# Patient Record
Sex: FEMALE | Race: WHITE | NOT HISPANIC OR LATINO
[De-identification: names, ages, dates, MRNs, and addresses within clinical notes are randomized per-mention and may not be internally consistent; named-entity substitution may affect disease eponyms.]

---

## 2017-04-17 ENCOUNTER — APPOINTMENT (OUTPATIENT)
Dept: FAMILY MEDICINE | Facility: CLINIC | Age: 82
End: 2017-04-17

## 2017-04-17 ENCOUNTER — LABORATORY RESULT (OUTPATIENT)
Age: 82
End: 2017-04-17

## 2017-04-17 VITALS
BODY MASS INDEX: 31.07 KG/M2 | DIASTOLIC BLOOD PRESSURE: 80 MMHG | HEIGHT: 64 IN | SYSTOLIC BLOOD PRESSURE: 130 MMHG | WEIGHT: 182 LBS

## 2017-04-17 DIAGNOSIS — M17.0 BILATERAL PRIMARY OSTEOARTHRITIS OF KNEE: ICD-10-CM

## 2017-04-17 DIAGNOSIS — Z87.891 PERSONAL HISTORY OF NICOTINE DEPENDENCE: ICD-10-CM

## 2017-04-25 LAB
T3RU NFR SERPL: 1.12 INDEX
T4 FREE SERPL-MCNC: 0.9 NG/DL
TSH SERPL-ACNC: 6.15 UIU/ML

## 2017-05-07 ENCOUNTER — FORM ENCOUNTER (OUTPATIENT)
Age: 82
End: 2017-05-07

## 2017-05-07 DIAGNOSIS — E04.2 NONTOXIC MULTINODULAR GOITER: ICD-10-CM

## 2017-05-08 ENCOUNTER — APPOINTMENT (OUTPATIENT)
Dept: ULTRASOUND IMAGING | Facility: CLINIC | Age: 82
End: 2017-05-08

## 2017-05-08 ENCOUNTER — OUTPATIENT (OUTPATIENT)
Dept: OUTPATIENT SERVICES | Facility: HOSPITAL | Age: 82
LOS: 1 days | End: 2017-05-08
Payer: MEDICARE

## 2017-05-08 DIAGNOSIS — E03.9 HYPOTHYROIDISM, UNSPECIFIED: ICD-10-CM

## 2017-05-08 PROCEDURE — 76536 US EXAM OF HEAD AND NECK: CPT

## 2017-05-25 PROBLEM — E04.2 MULTIPLE THYROID NODULES: Status: ACTIVE | Noted: 2017-05-25

## 2017-12-23 ENCOUNTER — APPOINTMENT (OUTPATIENT)
Dept: FAMILY MEDICINE | Facility: CLINIC | Age: 82
End: 2017-12-23
Payer: MEDICARE

## 2017-12-23 PROCEDURE — 99214 OFFICE O/P EST MOD 30 MIN: CPT

## 2018-04-30 ENCOUNTER — INPATIENT (INPATIENT)
Facility: HOSPITAL | Age: 83
LOS: 0 days | Discharge: HOME | DRG: 69 | End: 2018-05-01
Attending: EMERGENCY MEDICINE | Admitting: INTERNAL MEDICINE
Payer: COMMERCIAL

## 2018-04-30 VITALS
SYSTOLIC BLOOD PRESSURE: 133 MMHG | RESPIRATION RATE: 20 BRPM | TEMPERATURE: 99 F | OXYGEN SATURATION: 100 % | HEART RATE: 60 BPM | HEIGHT: 64 IN | DIASTOLIC BLOOD PRESSURE: 68 MMHG | WEIGHT: 164.91 LBS

## 2018-04-30 DIAGNOSIS — Z96.653 PRESENCE OF ARTIFICIAL KNEE JOINT, BILATERAL: Chronic | ICD-10-CM

## 2018-04-30 DIAGNOSIS — G45.9 TRANSIENT CEREBRAL ISCHEMIC ATTACK, UNSPECIFIED: ICD-10-CM

## 2018-04-30 LAB
ALBUMIN SERPL ELPH-MCNC: 4.2 G/DL — SIGNIFICANT CHANGE UP (ref 3.3–5.2)
ALP SERPL-CCNC: 102 U/L — SIGNIFICANT CHANGE UP (ref 40–120)
ALT FLD-CCNC: 14 U/L — SIGNIFICANT CHANGE UP
ANION GAP SERPL CALC-SCNC: 11 MMOL/L — SIGNIFICANT CHANGE UP (ref 5–17)
APTT BLD: 26.8 SEC — LOW (ref 27.5–37.4)
AST SERPL-CCNC: 21 U/L — SIGNIFICANT CHANGE UP
BASOPHILS # BLD AUTO: 0 K/UL — SIGNIFICANT CHANGE UP (ref 0–0.2)
BASOPHILS NFR BLD AUTO: 0.2 % — SIGNIFICANT CHANGE UP (ref 0–2)
BILIRUB SERPL-MCNC: <0.2 MG/DL — LOW (ref 0.4–2)
BUN SERPL-MCNC: 24 MG/DL — HIGH (ref 8–20)
CALCIUM SERPL-MCNC: 9.6 MG/DL — SIGNIFICANT CHANGE UP (ref 8.6–10.2)
CHLORIDE SERPL-SCNC: 107 MMOL/L — SIGNIFICANT CHANGE UP (ref 98–107)
CO2 SERPL-SCNC: 26 MMOL/L — SIGNIFICANT CHANGE UP (ref 22–29)
CREAT SERPL-MCNC: 0.72 MG/DL — SIGNIFICANT CHANGE UP (ref 0.5–1.3)
EOSINOPHIL # BLD AUTO: 0.3 K/UL — SIGNIFICANT CHANGE UP (ref 0–0.5)
EOSINOPHIL NFR BLD AUTO: 5.3 % — SIGNIFICANT CHANGE UP (ref 0–6)
GLUCOSE SERPL-MCNC: 102 MG/DL — SIGNIFICANT CHANGE UP (ref 70–115)
HCT VFR BLD CALC: 42.5 % — SIGNIFICANT CHANGE UP (ref 37–47)
HGB BLD-MCNC: 13.5 G/DL — SIGNIFICANT CHANGE UP (ref 12–16)
INR BLD: 0.93 RATIO — SIGNIFICANT CHANGE UP (ref 0.88–1.16)
LACTATE BLDV-MCNC: 0.6 MMOL/L — SIGNIFICANT CHANGE UP (ref 0.5–2)
LYMPHOCYTES # BLD AUTO: 2.5 K/UL — SIGNIFICANT CHANGE UP (ref 1–4.8)
LYMPHOCYTES # BLD AUTO: 47.5 % — SIGNIFICANT CHANGE UP (ref 20–55)
MCHC RBC-ENTMCNC: 29.4 PG — SIGNIFICANT CHANGE UP (ref 27–31)
MCHC RBC-ENTMCNC: 31.8 G/DL — LOW (ref 32–36)
MCV RBC AUTO: 92.6 FL — SIGNIFICANT CHANGE UP (ref 81–99)
MONOCYTES # BLD AUTO: 0.5 K/UL — SIGNIFICANT CHANGE UP (ref 0–0.8)
MONOCYTES NFR BLD AUTO: 8.7 % — SIGNIFICANT CHANGE UP (ref 3–10)
NEUTROPHILS # BLD AUTO: 2 K/UL — SIGNIFICANT CHANGE UP (ref 1.8–8)
NEUTROPHILS NFR BLD AUTO: 38.1 % — SIGNIFICANT CHANGE UP (ref 37–73)
PLATELET # BLD AUTO: 192 K/UL — SIGNIFICANT CHANGE UP (ref 150–400)
POTASSIUM SERPL-MCNC: 4.5 MMOL/L — SIGNIFICANT CHANGE UP (ref 3.5–5.3)
POTASSIUM SERPL-SCNC: 4.5 MMOL/L — SIGNIFICANT CHANGE UP (ref 3.5–5.3)
PROT SERPL-MCNC: 6.8 G/DL — SIGNIFICANT CHANGE UP (ref 6.6–8.7)
PROTHROM AB SERPL-ACNC: 10.2 SEC — SIGNIFICANT CHANGE UP (ref 9.8–12.7)
RBC # BLD: 4.59 M/UL — SIGNIFICANT CHANGE UP (ref 4.4–5.2)
RBC # FLD: 14.5 % — SIGNIFICANT CHANGE UP (ref 11–15.6)
SODIUM SERPL-SCNC: 144 MMOL/L — SIGNIFICANT CHANGE UP (ref 135–145)
WBC # BLD: 5.3 K/UL — SIGNIFICANT CHANGE UP (ref 4.8–10.8)
WBC # FLD AUTO: 5.3 K/UL — SIGNIFICANT CHANGE UP (ref 4.8–10.8)

## 2018-04-30 PROCEDURE — 99220: CPT

## 2018-04-30 PROCEDURE — 71045 X-RAY EXAM CHEST 1 VIEW: CPT | Mod: 26

## 2018-04-30 PROCEDURE — 70544 MR ANGIOGRAPHY HEAD W/O DYE: CPT | Mod: 26,59

## 2018-04-30 PROCEDURE — 70551 MRI BRAIN STEM W/O DYE: CPT | Mod: 26

## 2018-04-30 PROCEDURE — 70450 CT HEAD/BRAIN W/O DYE: CPT | Mod: 26

## 2018-04-30 PROCEDURE — 93880 EXTRACRANIAL BILAT STUDY: CPT | Mod: 26

## 2018-04-30 PROCEDURE — 93010 ELECTROCARDIOGRAM REPORT: CPT

## 2018-04-30 PROCEDURE — 70547 MR ANGIOGRAPHY NECK W/O DYE: CPT | Mod: 26

## 2018-04-30 RX ORDER — SODIUM CHLORIDE 9 MG/ML
3 INJECTION INTRAMUSCULAR; INTRAVENOUS; SUBCUTANEOUS ONCE
Qty: 0 | Refills: 0 | Status: COMPLETED | OUTPATIENT
Start: 2018-04-30 | End: 2018-04-30

## 2018-04-30 RX ADMIN — SODIUM CHLORIDE 3 MILLILITER(S): 9 INJECTION INTRAMUSCULAR; INTRAVENOUS; SUBCUTANEOUS at 10:43

## 2018-04-30 NOTE — ED PROVIDER NOTE - CRITICAL CARE INDICATION, MLM
patient was critically ill... Patient was critically ill with a high probability of imminent or life threatening deterioration. SUDDEN ONSET OF STROKE SYMPTOMS STAT EVALUATION BY  MD STAT CT SCAN STAT IVS STAT LABS STAT ACCUCHECK STAT TELE STROKE CONTACT STAT NEURO CONSULT ADMIT STROKE UNIT Patient was critically ill with a high probability of imminent or life threatening deterioration. SUDDEN ONSET OF STROKE SYMPTOMS STAT EVALUATION BY  MD STAT CT SCAN STAT IVS STAT LABS STAT ACCUCHECK STAT TELE STROKE CONTACT STAT NEURO CONSULT ADMIT OBSERVATION

## 2018-04-30 NOTE — ED ADULT NURSE NOTE - OBJECTIVE STATEMENT
87 yof presents to ed complaining of sudden onset slurred speech. last seen normal 9 am. airway patent lung sounds clear equal bilaterally abd soft nontender nondistended. PERRLA pt has full ROM BL upper and lower extremities facial symmetry. speech clear.  BS 96

## 2018-04-30 NOTE — CONSULT NOTE ADULT - SUBJECTIVE AND OBJECTIVE BOX
CHIEF COMPLAINT: Difficulty speaking    HPI: 87yFemale  	  · HPI Objective Statement: 86 y/o F pt with hx of thyroid issues and eye issues BIBA to ED for confusion. leaning to right side, and slurred speech today. Code Stroke activated on arrival. Last know well 9:00 by daughter. pt's states pt did not know daughter's name. No cardiac issues. 	    Patient is back to normal.  No previous h/o stroke. TIA. No h/o HTN, DM, HLD or cardiac disease    PAST MEDICAL & SURGICAL HISTORY:  Macular degeneration  H/O total knee replacement, bilateral    MEDICATIONS  (STANDING):  sodium chloride 0.9% lock flush 3 milliLiter(s) IV Push once    MEDICATIONS  (PRN):    Allergies    No Known Allergies    Intolerances        FAMILY HISTORY:   none relevant          SOCIAL HISTORY:    Tobacco:  no  Alcohol:  no  Drugs:  no        REVIEW OF SYSTEMS:    Relevant systems are negative except as noted in the chart, HPI, and PMH      VITAL SIGNS:  Vital Signs Last 24 Hrs  T(C): 37 (30 Apr 2018 09:51), Max: 37 (30 Apr 2018 09:51)  T(F): 98.6 (30 Apr 2018 09:51), Max: 98.6 (30 Apr 2018 09:51)  HR: 60 (30 Apr 2018 10:06) (60 - 60)  BP: 144/87 (30 Apr 2018 10:06) (133/68 - 144/87)  BP(mean): --  RR: 20 (30 Apr 2018 10:06) (20 - 20)  SpO2: 100% (30 Apr 2018 10:06) (100% - 100%)    PHYSICAL EXAMINATION:    General: Well-developed, well nourished, in no acute distress.  Cardiac:  Regular rate and rhythm. No carotid bruits appreciated.  Eyes: Fundoscopic examination was deferred.  Neurologic:  - Mental Status:  Alert, awake, oriented to person, place, and time; Speech is fluent. Language is normal. Good naming, repetition, formal fluency. Follows commands well.  Insight and knowledge appear appropriate.  Cranial Nerves II-XII:    II:  Visual acuity is normal for age ; Visual fields are full to confrontation; Pupils are equal, round, and reactive to light.  III, IV, VI:  Extraocular movements are intact without nystagmus.  V:  Facial sensation is intact in the V1-V3 distribution bilaterally.  VII:  Face is symmetric with normal eye closure and smile  VIII:  Hearing is grossly intact  IX, X, XII:  speech is clear  XI:  Head turning and shoulder shrug are intact.  - Motor:  Strength is 5/5 x 4.   There is no pronator drift. .  - Reflexes:  2+ and symmetric at the knees.  Plantar responses flexor.  - Sensory:  Symmetric to light touch  - Coordination:  Finger-nose-finger is normal. Rapid alternating hand and foot  movements are intact. Dexterity appears normal      LABS:                          13.5   5.3   )-----------( 192      ( 30 Apr 2018 10:16 )             42.5                   RADIOLOGY & ADDITIONAL STUDIES:        < from: CT Head No Cont (04.30.18 @ 10:01) >  Mild chronic microvascular changes without evidence of an   acute transcortical infarction or hemorrhage.    < end of copied text >    IMPRESSION:    TIA, r/o CVA. No obvious risk factors  PLAN:  1. Stroke/TIA protocols  2. Cerebrovascular risk factor assessment and management  3. Medical and Cardiac evaluation and treatment as indicated  4.Antiplatelet therapy as prescribed.  5. MRI, MRAs,, Carotid duplex, TTE etc

## 2018-04-30 NOTE — ED PROVIDER NOTE - CONSTITUTIONAL, MLM
normal... Well appearing, well nourished, awake, alert, oriented to person, place, time/situation and in no apparent distress. Well appearing, well nourished, awake, alert, confused

## 2018-04-30 NOTE — ED PROVIDER NOTE - CARE PLAN
Principal Discharge DX:	TIA (transient ischemic attack)  Secondary Diagnosis:	Macular degeneration of both eyes, unspecified type

## 2018-04-30 NOTE — ED PROVIDER NOTE - NEUROLOGICAL, MLM
Alert and oriented, no focal deficits, no motor or sensory deficits. confused, not following some commands, leaning to right

## 2018-04-30 NOTE — ED PROVIDER NOTE - OBJECTIVE STATEMENT
88 y/o F pt with hx of thyroid issues and eye issues BIBA to ED for confusion. leaning to right side, and slurred speech today. Code Stroke activated on arrival. Last know well 9:00 by daughter. pt's states pt did not know daughter's name. No cardiac issues.   PMD: Dr. Lopez

## 2018-04-30 NOTE — ED ADULT TRIAGE NOTE - CHIEF COMPLAINT QUOTE
Patient arrived via EMS, awake ,alert, confused. breathing unlabored.  As per daughter at bedside, patient was normal this morning and at 0900am patient began to have slurred, incoherent speech, right side lean, and confusion.  Dr. Prieto called to bedside.  Code stroke called

## 2018-05-01 VITALS
SYSTOLIC BLOOD PRESSURE: 133 MMHG | HEART RATE: 70 BPM | RESPIRATION RATE: 18 BRPM | DIASTOLIC BLOOD PRESSURE: 68 MMHG | OXYGEN SATURATION: 100 %

## 2018-05-01 LAB
CHOLEST SERPL-MCNC: 222 MG/DL — HIGH (ref 110–199)
HDLC SERPL-MCNC: 59 MG/DL — LOW
LIPID PNL WITH DIRECT LDL SERPL: 151 MG/DL — SIGNIFICANT CHANGE UP
TOTAL CHOLESTEROL/HDL RATIO MEASUREMENT: 4 RATIO — SIGNIFICANT CHANGE UP (ref 3.3–7.1)
TRIGL SERPL-MCNC: 62 MG/DL — SIGNIFICANT CHANGE UP (ref 10–200)

## 2018-05-01 PROCEDURE — 80061 LIPID PANEL: CPT

## 2018-05-01 PROCEDURE — 93880 EXTRACRANIAL BILAT STUDY: CPT

## 2018-05-01 PROCEDURE — 93005 ELECTROCARDIOGRAM TRACING: CPT

## 2018-05-01 PROCEDURE — 80053 COMPREHEN METABOLIC PANEL: CPT

## 2018-05-01 PROCEDURE — 70551 MRI BRAIN STEM W/O DYE: CPT

## 2018-05-01 PROCEDURE — 71045 X-RAY EXAM CHEST 1 VIEW: CPT

## 2018-05-01 PROCEDURE — 85730 THROMBOPLASTIN TIME PARTIAL: CPT

## 2018-05-01 PROCEDURE — 83605 ASSAY OF LACTIC ACID: CPT

## 2018-05-01 PROCEDURE — 85027 COMPLETE CBC AUTOMATED: CPT

## 2018-05-01 PROCEDURE — 93306 TTE W/DOPPLER COMPLETE: CPT | Mod: 26

## 2018-05-01 PROCEDURE — 99217: CPT

## 2018-05-01 PROCEDURE — 36415 COLL VENOUS BLD VENIPUNCTURE: CPT

## 2018-05-01 PROCEDURE — 82962 GLUCOSE BLOOD TEST: CPT

## 2018-05-01 PROCEDURE — 70450 CT HEAD/BRAIN W/O DYE: CPT

## 2018-05-01 PROCEDURE — 93306 TTE W/DOPPLER COMPLETE: CPT

## 2018-05-01 PROCEDURE — 70547 MR ANGIOGRAPHY NECK W/O DYE: CPT

## 2018-05-01 PROCEDURE — 99284 EMERGENCY DEPT VISIT MOD MDM: CPT

## 2018-05-01 PROCEDURE — 70544 MR ANGIOGRAPHY HEAD W/O DYE: CPT

## 2018-05-01 PROCEDURE — 99291 CRITICAL CARE FIRST HOUR: CPT

## 2018-05-01 PROCEDURE — 85610 PROTHROMBIN TIME: CPT

## 2018-05-01 RX ORDER — ATORVASTATIN CALCIUM 80 MG/1
1 TABLET, FILM COATED ORAL
Qty: 30 | Refills: 0
Start: 2018-05-01 | End: 2018-05-30

## 2018-05-01 RX ORDER — ASPIRIN/CALCIUM CARB/MAGNESIUM 324 MG
325 TABLET ORAL ONCE
Qty: 0 | Refills: 0 | Status: COMPLETED | OUTPATIENT
Start: 2018-05-01 | End: 2018-05-01

## 2018-05-01 RX ORDER — ASPIRIN/CALCIUM CARB/MAGNESIUM 324 MG
1 TABLET ORAL
Qty: 30 | Refills: 0
Start: 2018-05-01 | End: 2018-05-30

## 2018-05-01 RX ADMIN — Medication 325 MILLIGRAM(S): at 13:03

## 2018-05-01 NOTE — PROGRESS NOTE ADULT - SUBJECTIVE AND OBJECTIVE BOX
INTERVAL HISTORY:  asymptomatic, feels well      VITAL SIGNS:  Vital Signs Last 24 Hrs  T(C): 36.8 (01 May 2018 02:40), Max: 37 (30 Apr 2018 09:51)  T(F): 98.3 (01 May 2018 02:40), Max: 98.6 (30 Apr 2018 09:51)  HR: 76 (01 May 2018 07:54) (55 - 77)  BP: 157/68 (01 May 2018 07:54) (120/58 - 158/72)  BP(mean): --  RR: 22 (01 May 2018 07:54) (16 - 22)  SpO2: 94% (01 May 2018 07:54) (94% - 100%)    PHYSICAL EXAMINATION:    Mentation:  nl  Language/Speech:nl  CN: full  Visual Fields: full  Motor: nl  Sensory: nl  DTR:    Babinski: plantar      MEDS:  MEDICATIONS  (STANDING):  aspirin 325 milliGRAM(s) Oral once    MEDICATIONS  (PRN):      LABS:                          13.5   5.3   )-----------( 192      ( 30 Apr 2018 10:16 )             42.5     04-30    144  |  107  |  24.0<H>  ----------------------------<  102  4.5   |  26.0  |  0.72    Ca    9.6      30 Apr 2018 10:16    TPro  6.8  /  Alb  4.2  /  TBili  <0.2<L>  /  DBili  x   /  AST  21  /  ALT  14  /  AlkPhos  102  04-30    LIVER FUNCTIONS - ( 30 Apr 2018 10:16 )  Alb: 4.2 g/dL / Pro: 6.8 g/dL / ALK PHOS: 102 U/L / ALT: 14 U/L / AST: 21 U/L / GGT: x               RADIOLOGY & ADDITIONAL STUDIES:    MR head, MRAs, carotid duples and TTE are all normal/satisfactory    IMPRESSION & PLAN:    TIA- undetermined etiology  Antiplatelet therapy as prescribed.  Cerebrovascular risk factor assessment and management  Medical and Cardiac evaluation and treatment as indicated-  she should be considered for JEOVANNY and ILR - ok as outpatient  Will not actively follow.   Neurologically cleared for discharge/disposition.  Please recontact as needed.  Follow up in office in 4-6 weeks as instructed. EEG, TCD? etc

## 2018-05-01 NOTE — CONSULT NOTE ADULT - SUBJECTIVE AND OBJECTIVE BOX
CARDIOLOGY CONSULTATION NOTE ([facility])    Reason for Consultation:   TIA    History obtained by: Patient and medical record    Chief complaint:   Unable to speak, AMS.     HPI  Patient is a 87y old  Female who presents with a chief complaint of unable to speak to daughter, speech was garbled,  daughter states mouth was dropping to the right, and patient was also falling over to the right.  Denies chest pain, sob, palpitations n/v/d, headache, dizziness, or any pain.   Patient had intermittent memory loss during the episode.  Denies hx of syncope stroke, or tia's.  Patient not taking aspirin or statins.  Patient also denies cardiac diease, vascular disease, dm, or any neurologic deficits  Noted elevated outpatient LDL > 160 in January.        REVIEW OF SYMPTOMS: Cardiovascular:  See HPI. No chest pain,  No dyspnea,  No syncope,  No palpitations, No dizziness, No Orthopnea,  No Paroxsymal nocturnal dyspnea;  Respiratory:  No Dyspnea, No cough,     Genitourinary:  No dysuria, no hematuria; Gastrointestinal:  No nausea, no vomiting. No diarrhea.  No abdominal pain. No dark color stool, no melena ; Neurological: See HPI, No headache, no dizziness, no slurred speech;  Psychiatric: No agitation, no anxiety.  ALL OTHER REVIEW OF SYSTEMS ARE NEGATIVE.    ALLERGIES: Allergies    CURRENT MEDICATIONS:  None     HOME MEDICATIONS:  None    PAST MEDICAL HISTORY  Macular degeneration    PAST SURGICAL HISTORY  H/O total knee replacement, bilateral      FAMILY HISTORY:  No pertinent family history in first degree relative    CIGARETTES:    Quit at age of 25, smoked 10 years.      ALCOHOL:  Denies    DRUGS:  Denies    Vital Signs Last 24 Hrs  T(C): 36.8 (01 May 2018 02:40), Max: 36.8 (01 May 2018 02:40)  T(F): 98.3 (01 May 2018 02:40), Max: 98.3 (01 May 2018 02:40)  HR: 70 (01 May 2018 11:45) (55 - 77)  BP: 133/68 (01 May 2018 11:45) (120/58 - 158/72)  BP(mean): --  RR: 18 (01 May 2018 11:45) (16 - 22)  SpO2: 100% (01 May 2018 11:45) (94% - 100%)    PHYSICAL EXAM:  Constitutional: Comfortable . No acute distress.   HEENT: Atraumatic and normcephalic , neck is supple . + left carotid bruit, no JVD. No carotid bruit. PEERL   CNS: A&Ox3. No focal deficits. EOMI. Cranial nerves II-IX are intact.   Lymph Nodes: Cervical : Not palpable.  Respiratory: CTAB, RR wnl for rate and rhythm, color pink  Cardiovascular: S1S2 RRR. No murmur/rubs or gallop.  Gastrointestinal: Soft non-tender and non distended . +Bowel sounds. negative Betancur's sign.  Extremities: No edema.   Psychiatric: Calm . no agitation.  Skin: No skin rash/ulcers visualized to face, hands or feet.    LABS:                        13.5   5.3   )-----------( 192      ( 30 Apr 2018 10:16 )             42.5       144  |  107  |  24.0<H>  ----------------------------<  102  4.5   |  26.0  |  0.72  Ca    9.6      30 Apr 2018 10:16  TPro  6.8  /  Alb  4.2  /  TBili  <0.2<L>  /  DBili  x   /  AST  21  /  ALT  14  /  AlkPhos  102  04-30  PT/INR - ( 30 Apr 2018 10:16 )   PT: 10.2 sec;   INR: 0.93 ratio    PTT - ( 30 Apr 2018 10:16 )  PTT:26.8 sec    INTERPRETATION OF TELEMETRY: Reviewed by me.  SR, no ecotpry noted.    ECG: Reviewed by me.     RADIOLOGY & ADDITIONAL STUDIES:    X-ray:  reviewed by me.    Impression: No acute pulmonary disease.    CT scan:    IMPRESSION:  Mild chronic microvascular changes without evidence of an   acute transcortical infarction or hemorrhage. MR is a more sensitive   imaging modality for the evaluation of an acute infarction. Findings   discussed with Dr. Prieto at 9:58 AM.  MRI:   IMPRESSION: No acute infarction  MRA Head  IMPRESSION: Unremarkable MRA of the brain.  MRA Neck  IMPRESSION:  Unremarkable noncontrast MRA of the neck.  ECHO FINDINGS:    Summary:   1. Left ventricular ejection fraction, by visual estimation, is 70 to   75%.   2. Normal global left ventricular systolic function.   3. Spectral Doppler shows impaired relaxation pattern of left   ventricular myocardial filling (Grade I diastolic dysfunction).   4. Normal right ventricular size and function.   5. There is no evidence of pericardial effusion.   6. Trace mitral valve regurgitation.   7. The main pulmonary artery is normal in size. CARDIOLOGY CONSULTATION NOTE ([facility])    Reason for Consultation:   TIA    History obtained by: Patient and medical record    Chief complaint:   Unable to speak, AMS.     HPI  Patient is a 87y old  Female who presents with a chief complaint of unable to speak to daughter, speech was garbled,  daughter states mouth was dropping to the right, and patient was also falling over to the right.  Denies chest pain, sob, palpitations n/v/d, headache, dizziness, or any pain.   Patient had intermittent memory loss during the episode.  Denies hx of syncope stroke, or tia's.  Patient not taking aspirin or statins.  Patient also denies cardiac diease, vascular disease, dm, or any neurologic deficits  Noted elevated outpatient LDL > 160 in 2016.      REVIEW OF SYMPTOMS: Cardiovascular:  See HPI. No chest pain,  No dyspnea,  No syncope,  No palpitations, No dizziness, No Orthopnea,  No Paroxsymal nocturnal dyspnea;  Respiratory:  No Dyspnea, No cough,     Genitourinary:  No dysuria, no hematuria; Gastrointestinal:  No nausea, no vomiting. No diarrhea.  No abdominal pain. No dark color stool, no melena ; Neurological: See HPI, No headache, no dizziness, no slurred speech;  Psychiatric: No agitation, no anxiety.  ALL OTHER REVIEW OF SYSTEMS ARE NEGATIVE.    ALLERGIES: Allergies    CURRENT MEDICATIONS:  None     HOME MEDICATIONS:  None    PAST MEDICAL HISTORY  Macular degeneration    PAST SURGICAL HISTORY  H/O total knee replacement, bilateral      FAMILY HISTORY:  No pertinent family history in first degree relative    CIGARETTES:    Quit at age of 25, smoked 10 years.      ALCOHOL:  Denies    DRUGS:  Denies    Vital Signs Last 24 Hrs  T(C): 36.8 (01 May 2018 02:40), Max: 36.8 (01 May 2018 02:40)  T(F): 98.3 (01 May 2018 02:40), Max: 98.3 (01 May 2018 02:40)  HR: 70 (01 May 2018 11:45) (55 - 77)  BP: 133/68 (01 May 2018 11:45) (120/58 - 158/72)  BP(mean): --  RR: 18 (01 May 2018 11:45) (16 - 22)  SpO2: 100% (01 May 2018 11:45) (94% - 100%)    PHYSICAL EXAM:  Constitutional: Comfortable . No acute distress.   HEENT: Atraumatic and normcephalic , neck is supple . + left carotid bruit, no JVD. No carotid bruit. PEERL   CNS: A&Ox3. No focal deficits. EOMI. Cranial nerves II-IX are intact.   Lymph Nodes: Cervical : Not palpable.  Respiratory: CTAB, RR wnl for rate and rhythm, color pink  Cardiovascular: S1S2 RRR. No murmur/rubs or gallop.  Gastrointestinal: Soft non-tender and non distended . +Bowel sounds. negative Betancur's sign.  Extremities: No edema.   Psychiatric: Calm . no agitation.  Skin: No skin rash/ulcers visualized to face, hands or feet.    LABS:                        13.5   5.3   )-----------( 192      ( 30 Apr 2018 10:16 )             42.5       144  |  107  |  24.0<H>  ----------------------------<  102  4.5   |  26.0  |  0.72  Ca    9.6      30 Apr 2018 10:16  TPro  6.8  /  Alb  4.2  /  TBili  <0.2<L>  /  DBili  x   /  AST  21  /  ALT  14  /  AlkPhos  102  04-30  PT/INR - ( 30 Apr 2018 10:16 )   PT: 10.2 sec;   INR: 0.93 ratio    PTT - ( 30 Apr 2018 10:16 )  PTT:26.8 sec    INTERPRETATION OF TELEMETRY: Reviewed by me.  SR, no ecotpry noted.    ECG: Reviewed by me.     RADIOLOGY & ADDITIONAL STUDIES:    X-ray:  reviewed by me.    Impression: No acute pulmonary disease.    CT scan:    IMPRESSION:  Mild chronic microvascular changes without evidence of an   acute transcortical infarction or hemorrhage. MR is a more sensitive   imaging modality for the evaluation of an acute infarction. Findings   discussed with Dr. Prieot at 9:58 AM.  MRI:   IMPRESSION: No acute infarction  MRA Head  IMPRESSION: Unremarkable MRA of the brain.  MRA Neck  IMPRESSION:  Unremarkable noncontrast MRA of the neck.  ECHO FINDINGS:    Summary:   1. Left ventricular ejection fraction, by visual estimation, is 70 to   75%.   2. Normal global left ventricular systolic function.   3. Spectral Doppler shows impaired relaxation pattern of left   ventricular myocardial filling (Grade I diastolic dysfunction).   4. Normal right ventricular size and function.   5. There is no evidence of pericardial effusion.   6. Trace mitral valve regurgitation.   7. The main pulmonary artery is normal in size.

## 2018-05-01 NOTE — ED CDU PROVIDER DISPOSITION NOTE - CLINICAL COURSE
patient was admitted to OBS for TIA workup. Patient has remained without any neuro deficits while in hospital. Patient MRI brain, echo and carotids were negative. Patient was evaluated by neuro and cardio.   patient is stable for discharge for outpatient workup  patient and family agree with plan

## 2018-05-01 NOTE — CONSULT NOTE ADULT - ASSESSMENT
Patient is a 87y old  Female who presents with a chief complaint of unable to speak to daughter, speech was garbled,  daughter states mouth was dropping to the right, and patient was also falling over to the right.  Denies chest pain, sob, palpitations n/v/d, headache, dizziness, or any pain.   Patient had intermittent memory loss during the episode.  Denies hx of syncope stroke, or tia's.  Patient not taking aspirin or statins.  Patient also denies cardiac diease, vascular disease, dm, or any neurologic deficits  Noted elevated outpatient LDL > 160 in January.      Problem/Plan  ·	TIA, MRI/MRA/ and carotid studies stable and cleared for discharge.    Stable to discharge home  Add aspirirn 81mg po dialy  Lipitor 10mg po qhs  Follow up with PCP MD Lopez.

## 2018-05-01 NOTE — ED ADULT NURSE REASSESSMENT NOTE - NS ED NURSE REASSESS COMMENT FT1
pt sitting in chair eating lunch  daughters at bedside
pt up ambulating to bathroom with daughters gait steady  no change in condition
Pt. received at 1930, VSS, awake. alert, and answering all questions.  no deficits noted. informed of plan of care. will continue to monitor and maintain safety.
pt ret'd from ECHO  Dr Kwon at bedside for neuro eval     daughter at bedside
pt ret'd from testing    daughter at bedside  pt timo breakfast  no complaints  no change
transported to [place]
Pt. sleeping but awakens to voice, NIH remains 0. awaiting test in AM. will continue to monitor and maintain safety.

## 2018-05-01 NOTE — CONSULT NOTE ADULT - ATTENDING COMMENTS
I saw and examined Mrs. Torres. We reviewed her studies. TTE reviewed as well. Episode of TIA, lasted about 20 minutes. Two of her daughter at bedside. Pt was not on antiplatelet therapy at home. I agree with use of ASA and statin.   She will need follow up in the office. Family will make appt and fu

## 2018-05-02 PROBLEM — H35.30 UNSPECIFIED MACULAR DEGENERATION: Chronic | Status: ACTIVE | Noted: 2018-04-30

## 2018-05-02 RX ORDER — CEFPROZIL 500 MG/1
500 TABLET ORAL
Qty: 20 | Refills: 0 | Status: DISCONTINUED | COMMUNITY
Start: 2017-12-23 | End: 2018-05-02

## 2018-05-10 ENCOUNTER — APPOINTMENT (OUTPATIENT)
Dept: FAMILY MEDICINE | Facility: CLINIC | Age: 83
End: 2018-05-10
Payer: MEDICARE

## 2018-05-10 VITALS — SYSTOLIC BLOOD PRESSURE: 144 MMHG | DIASTOLIC BLOOD PRESSURE: 84 MMHG

## 2018-05-10 VITALS
BODY MASS INDEX: 31.07 KG/M2 | SYSTOLIC BLOOD PRESSURE: 164 MMHG | HEIGHT: 64 IN | OXYGEN SATURATION: 97 % | DIASTOLIC BLOOD PRESSURE: 70 MMHG | HEART RATE: 52 BPM | WEIGHT: 182 LBS | RESPIRATION RATE: 12 BRPM

## 2018-05-10 DIAGNOSIS — Z12.83 ENCOUNTER FOR SCREENING FOR MALIGNANT NEOPLASM OF SKIN: ICD-10-CM

## 2018-05-10 DIAGNOSIS — R60.0 LOCALIZED EDEMA: ICD-10-CM

## 2018-05-10 PROCEDURE — 99495 TRANSJ CARE MGMT MOD F2F 14D: CPT

## 2018-05-13 DIAGNOSIS — Z96.1 PRESENCE OF INTRAOCULAR LENS: ICD-10-CM

## 2018-05-13 DIAGNOSIS — H35.3122 NONEXUDATIVE AGE-RELATED MACULAR DEGENERATION, LEFT EYE, INTERMEDIATE DRY STAGE: ICD-10-CM

## 2018-05-21 ENCOUNTER — APPOINTMENT (OUTPATIENT)
Dept: CARDIOLOGY | Facility: CLINIC | Age: 83
End: 2018-05-21
Payer: MEDICARE

## 2018-05-21 ENCOUNTER — NON-APPOINTMENT (OUTPATIENT)
Age: 83
End: 2018-05-21

## 2018-05-21 VITALS — SYSTOLIC BLOOD PRESSURE: 122 MMHG | DIASTOLIC BLOOD PRESSURE: 64 MMHG

## 2018-05-21 VITALS
DIASTOLIC BLOOD PRESSURE: 68 MMHG | BODY MASS INDEX: 31.24 KG/M2 | SYSTOLIC BLOOD PRESSURE: 124 MMHG | WEIGHT: 182 LBS | HEART RATE: 56 BPM | OXYGEN SATURATION: 96 %

## 2018-05-21 PROCEDURE — 93000 ELECTROCARDIOGRAM COMPLETE: CPT

## 2018-05-21 PROCEDURE — 99214 OFFICE O/P EST MOD 30 MIN: CPT

## 2018-06-13 ENCOUNTER — APPOINTMENT (OUTPATIENT)
Dept: FAMILY MEDICINE | Facility: CLINIC | Age: 83
End: 2018-06-13
Payer: MEDICARE

## 2018-06-13 VITALS
SYSTOLIC BLOOD PRESSURE: 116 MMHG | BODY MASS INDEX: 30.73 KG/M2 | HEART RATE: 67 BPM | HEIGHT: 64 IN | WEIGHT: 180 LBS | DIASTOLIC BLOOD PRESSURE: 72 MMHG | OXYGEN SATURATION: 97 %

## 2018-06-13 PROCEDURE — 99214 OFFICE O/P EST MOD 30 MIN: CPT

## 2018-06-13 RX ORDER — LISINOPRIL 10 MG/1
10 TABLET ORAL DAILY
Qty: 90 | Refills: 1 | Status: DISCONTINUED | COMMUNITY
Start: 2018-05-10 | End: 2018-06-13

## 2018-06-14 NOTE — PHYSICAL EXAM
[No Acute Distress] : no acute distress [Normal Oropharynx] : the oropharynx was normal [Supple] : supple [Clear to Auscultation] : lungs were clear to auscultation bilaterally [Normal S1, S2] : normal S1 and S2 [No Focal Deficits] : no focal deficits [Normal Affect] : the affect was normal [Soft] : abdomen soft

## 2018-06-14 NOTE — HISTORY OF PRESENT ILLNESS
[FreeTextEntry1] : Patient at office for follow up, states last Lisinopril taken approximately 10 days after Rx given on May 10, 2018 as she developed diarrhea. Patient requesting refill on Atorvastatin, states last use about 1 week ago. [de-identified] : pt notes feel good, pt has 288 pages from [facility], scaanned in prior to chart update, will have team review and abstract pertinent health maintenance as pt has completed a lot of maintence elemnets in past\par \par long discussion w pt and son in law about tia episode which occurred without pt on asa, pt now on asa 81mg, \par pt reports explosive diarrhea and ioncontinence form lisinopril pt self dc and resolved

## 2018-06-14 NOTE — REVIEW OF SYSTEMS
[Fever] : no fever [Earache] : no earache [Chest Pain] : no chest pain [Shortness Of Breath] : no shortness of breath [Abdominal Pain] : no abdominal pain [Headache] : no headache [FreeTextEntry7] : see hpi

## 2018-07-24 PROBLEM — H35.3122 INTERMEDIATE STAGE DRY AGE-RELATED MACULAR DEGENERATION OF LEFT EYE: Status: ACTIVE | Noted: 2018-07-06

## 2018-07-24 PROBLEM — Z96.1 PSEUDOPHAKIA: Status: ACTIVE | Noted: 2018-07-06

## 2018-09-13 ENCOUNTER — APPOINTMENT (OUTPATIENT)
Dept: FAMILY MEDICINE | Facility: CLINIC | Age: 83
End: 2018-09-13
Payer: MEDICARE

## 2018-09-13 VITALS
HEIGHT: 64 IN | WEIGHT: 187 LBS | OXYGEN SATURATION: 97 % | HEART RATE: 57 BPM | SYSTOLIC BLOOD PRESSURE: 120 MMHG | BODY MASS INDEX: 31.92 KG/M2 | DIASTOLIC BLOOD PRESSURE: 70 MMHG

## 2018-09-13 DIAGNOSIS — E03.9 HYPOTHYROIDISM, UNSPECIFIED: ICD-10-CM

## 2018-09-13 DIAGNOSIS — Z87.09 PERSONAL HISTORY OF OTHER DISEASES OF THE RESPIRATORY SYSTEM: ICD-10-CM

## 2018-09-13 DIAGNOSIS — Z92.29 PERSONAL HISTORY OF OTHER DRUG THERAPY: ICD-10-CM

## 2018-09-13 DIAGNOSIS — H54.2X11: ICD-10-CM

## 2018-09-13 PROCEDURE — 90662 IIV NO PRSV INCREASED AG IM: CPT

## 2018-09-13 PROCEDURE — G0008: CPT

## 2018-09-13 PROCEDURE — 90670 PCV13 VACCINE IM: CPT

## 2018-09-13 PROCEDURE — G0009: CPT

## 2018-09-13 PROCEDURE — 99214 OFFICE O/P EST MOD 30 MIN: CPT | Mod: 25

## 2018-09-13 RX ORDER — ALBUTEROL SULFATE 90 UG/1
108 (90 BASE) AEROSOL, METERED RESPIRATORY (INHALATION)
Qty: 1 | Refills: 0 | Status: DISCONTINUED | COMMUNITY
Start: 2017-12-23 | End: 2018-09-13

## 2018-09-15 NOTE — PHYSICAL EXAM
[No Acute Distress] : no acute distress [Normal Sclera/Conjunctiva] : normal sclera/conjunctiva [Normal Oropharynx] : the oropharynx was normal [Supple] : supple [No Lymphadenopathy] : no lymphadenopathy [No Respiratory Distress] : no respiratory distress  [Clear to Auscultation] : lungs were clear to auscultation bilaterally [Normal Rate] : normal rate  [Regular Rhythm] : with a regular rhythm [Normal S1, S2] : normal S1 and S2 [No Murmur] : no murmur heard [No Edema] : there was no peripheral edema [Normal Anterior Cervical Nodes] : no anterior cervical lymphadenopathy [Normal Mood] : the mood was normal

## 2018-09-15 NOTE — HISTORY OF PRESENT ILLNESS
[FreeTextEntry1] : Patient here to follow up on blood work she had done at [facility] 09/06/18.\par Also requesting a refill on her Atorvastatin. [de-identified] : Pt feels well.

## 2018-11-26 ENCOUNTER — APPOINTMENT (OUTPATIENT)
Dept: CARDIOLOGY | Facility: CLINIC | Age: 83
End: 2018-11-26

## 2019-01-21 DIAGNOSIS — H18.599 OTHER HEREDITARY CORNEAL DYSTROPHIES, UNSPECIFIED EYE: ICD-10-CM

## 2019-01-21 DIAGNOSIS — H40.9 UNSPECIFIED GLAUCOMA: ICD-10-CM

## 2019-02-13 ENCOUNTER — LABORATORY RESULT (OUTPATIENT)
Age: 84
End: 2019-02-13

## 2019-02-25 LAB
ALBUMIN SERPL ELPH-MCNC: 4.3 G/DL
ALP BLD-CCNC: 117 U/L
ALT SERPL-CCNC: 18 U/L
ANION GAP SERPL CALC-SCNC: 11 MMOL/L
AST SERPL-CCNC: 20 U/L
BILIRUB SERPL-MCNC: 0.5 MG/DL
BUN SERPL-MCNC: 22 MG/DL
CALCIUM SERPL-MCNC: 9.8 MG/DL
CHLORIDE SERPL-SCNC: 109 MMOL/L
CHOLEST SERPL-MCNC: 184 MG/DL
CHOLEST/HDLC SERPL: 2.6 RATIO
CO2 SERPL-SCNC: 26 MMOL/L
CREAT SERPL-MCNC: 0.71 MG/DL
GLUCOSE SERPL-MCNC: 87 MG/DL
HDLC SERPL-MCNC: 72 MG/DL
LDLC SERPL CALC-MCNC: 98 MG/DL
POTASSIUM SERPL-SCNC: 4.4 MMOL/L
PROT SERPL-MCNC: 6.5 G/DL
SODIUM SERPL-SCNC: 146 MMOL/L
T3RU NFR SERPL: 1.17 INDEX
T4 FREE SERPL-MCNC: 0.9 NG/DL
THYROGLOB AB SERPL-ACNC: <20 IU/ML
THYROPEROXIDASE AB SERPL IA-ACNC: <10 IU/ML
TRIGL SERPL-MCNC: 72 MG/DL
TSH SERPL-ACNC: 6.81 UIU/ML

## 2019-03-06 ENCOUNTER — APPOINTMENT (OUTPATIENT)
Dept: FAMILY MEDICINE | Facility: CLINIC | Age: 84
End: 2019-03-06
Payer: MEDICARE

## 2019-03-06 VITALS — BODY MASS INDEX: 31.41 KG/M2 | WEIGHT: 183 LBS

## 2019-03-06 VITALS
WEIGHT: 157 LBS | DIASTOLIC BLOOD PRESSURE: 64 MMHG | HEART RATE: 70 BPM | BODY MASS INDEX: 26.8 KG/M2 | SYSTOLIC BLOOD PRESSURE: 120 MMHG | HEIGHT: 64 IN | RESPIRATION RATE: 12 BRPM | OXYGEN SATURATION: 98 %

## 2019-03-06 DIAGNOSIS — Z92.29 PERSONAL HISTORY OF OTHER DRUG THERAPY: ICD-10-CM

## 2019-03-06 DIAGNOSIS — H35.30 UNSPECIFIED MACULAR DEGENERATION: ICD-10-CM

## 2019-03-06 DIAGNOSIS — G45.9 TRANSIENT CEREBRAL ISCHEMIC ATTACK, UNSPECIFIED: ICD-10-CM

## 2019-03-06 DIAGNOSIS — Z87.898 PERSONAL HISTORY OF OTHER SPECIFIED CONDITIONS: ICD-10-CM

## 2019-03-06 PROCEDURE — 99215 OFFICE O/P EST HI 40 MIN: CPT

## 2019-03-07 ENCOUNTER — APPOINTMENT (OUTPATIENT)
Dept: FAMILY MEDICINE | Facility: CLINIC | Age: 84
End: 2019-03-07

## 2019-03-07 PROBLEM — H35.30 AMD (AGE RELATED MACULAR DEGENERATION): Status: ACTIVE | Noted: 2017-12-26

## 2019-03-07 NOTE — REVIEW OF SYSTEMS
[Hearing Loss] : hearing loss [Fever] : no fever [Earache] : no earache [Nasal Discharge] : no nasal discharge [Chest Pain] : no chest pain [Shortness Of Breath] : no shortness of breath [Vomiting] : no vomiting [FreeTextEntry3] : macular degen

## 2019-03-07 NOTE — PHYSICAL EXAM
[Normal Outer Ear/Nose] : the outer ears and nose were normal in appearance [Normal Oropharynx] : the oropharynx was normal [Supple] : supple [Clear to Auscultation] : lungs were clear to auscultation bilaterally [Normal S1, S2] : normal S1 and S2 [Soft] : abdomen soft [Normal Anterior Cervical Nodes] : no anterior cervical lymphadenopathy [No CVA Tenderness] : no CVA  tenderness [de-identified] : no cogwheel rigiidity  [de-identified] : facies symmetric, equsl strength bl

## 2019-03-07 NOTE — HISTORY OF PRESENT ILLNESS
[FreeTextEntry1] : Pt is here for follow up and blood work results. [de-identified] : pt notes no cp, no sob, pt daughter notes mom has auditory halluciantions , as she cant see well w macular degeneratipon, and cant hear well either. pt notes she does lose urine at times but does not keep her home , she did not want to join a senior group, pt daughter also notesshe has been  [place] in   the winter, i encouraged pt to get to some actiivites and wear depnds iff that i a concern.

## 2019-03-08 ENCOUNTER — EMERGENCY (EMERGENCY)
Facility: HOSPITAL | Age: 84
LOS: 1 days | Discharge: HOME | End: 2019-03-08
Attending: EMERGENCY MEDICINE
Payer: MEDICARE

## 2019-03-08 VITALS
SYSTOLIC BLOOD PRESSURE: 99 MMHG | HEART RATE: 61 BPM | DIASTOLIC BLOOD PRESSURE: 63 MMHG | OXYGEN SATURATION: 95 % | RESPIRATION RATE: 18 BRPM | TEMPERATURE: 99 F

## 2019-03-08 VITALS
DIASTOLIC BLOOD PRESSURE: 73 MMHG | TEMPERATURE: 100 F | RESPIRATION RATE: 18 BRPM | SYSTOLIC BLOOD PRESSURE: 118 MMHG | HEART RATE: 71 BPM | OXYGEN SATURATION: 95 %

## 2019-03-08 DIAGNOSIS — Z96.653 PRESENCE OF ARTIFICIAL KNEE JOINT, BILATERAL: Chronic | ICD-10-CM

## 2019-03-08 LAB
ALBUMIN SERPL ELPH-MCNC: 3.4 G/DL — SIGNIFICANT CHANGE UP (ref 3.3–5.2)
ALP SERPL-CCNC: 115 U/L — SIGNIFICANT CHANGE UP (ref 40–120)
ALT FLD-CCNC: 30 U/L — SIGNIFICANT CHANGE UP
ANION GAP SERPL CALC-SCNC: 11 MMOL/L — SIGNIFICANT CHANGE UP (ref 5–17)
APPEARANCE UR: CLEAR — SIGNIFICANT CHANGE UP
AST SERPL-CCNC: 34 U/L — HIGH
BACTERIA # UR AUTO: ABNORMAL
BASOPHILS # BLD AUTO: 0 K/UL — SIGNIFICANT CHANGE UP (ref 0–0.2)
BASOPHILS NFR BLD AUTO: 0.1 % — SIGNIFICANT CHANGE UP (ref 0–2)
BILIRUB SERPL-MCNC: 0.5 MG/DL — SIGNIFICANT CHANGE UP (ref 0.4–2)
BILIRUB UR-MCNC: NEGATIVE — SIGNIFICANT CHANGE UP
BUN SERPL-MCNC: 20 MG/DL — SIGNIFICANT CHANGE UP (ref 8–20)
CALCIUM SERPL-MCNC: 9.2 MG/DL — SIGNIFICANT CHANGE UP (ref 8.6–10.2)
CHLORIDE SERPL-SCNC: 106 MMOL/L — SIGNIFICANT CHANGE UP (ref 98–107)
CO2 SERPL-SCNC: 20 MMOL/L — LOW (ref 22–29)
COLOR SPEC: YELLOW — SIGNIFICANT CHANGE UP
CREAT SERPL-MCNC: 0.56 MG/DL — SIGNIFICANT CHANGE UP (ref 0.5–1.3)
DIFF PNL FLD: ABNORMAL
EOSINOPHIL # BLD AUTO: 0 K/UL — SIGNIFICANT CHANGE UP (ref 0–0.5)
EOSINOPHIL NFR BLD AUTO: 0.1 % — SIGNIFICANT CHANGE UP (ref 0–6)
EPI CELLS # UR: ABNORMAL
GLUCOSE SERPL-MCNC: 116 MG/DL — HIGH (ref 70–115)
GLUCOSE UR QL: NEGATIVE MG/DL — SIGNIFICANT CHANGE UP
HCT VFR BLD CALC: 38.8 % — SIGNIFICANT CHANGE UP (ref 37–47)
HGB BLD-MCNC: 12.6 G/DL — SIGNIFICANT CHANGE UP (ref 12–16)
KETONES UR-MCNC: ABNORMAL
LEUKOCYTE ESTERASE UR-ACNC: ABNORMAL
LYMPHOCYTES # BLD AUTO: 1.8 K/UL — SIGNIFICANT CHANGE UP (ref 1–4.8)
LYMPHOCYTES # BLD AUTO: 17.3 % — LOW (ref 20–55)
MCHC RBC-ENTMCNC: 29.9 PG — SIGNIFICANT CHANGE UP (ref 27–31)
MCHC RBC-ENTMCNC: 32.5 G/DL — SIGNIFICANT CHANGE UP (ref 32–36)
MCV RBC AUTO: 92.2 FL — SIGNIFICANT CHANGE UP (ref 81–99)
MONOCYTES # BLD AUTO: 1.4 K/UL — HIGH (ref 0–0.8)
MONOCYTES NFR BLD AUTO: 12.9 % — HIGH (ref 3–10)
NEUTROPHILS # BLD AUTO: 7.3 K/UL — SIGNIFICANT CHANGE UP (ref 1.8–8)
NEUTROPHILS NFR BLD AUTO: 69.4 % — SIGNIFICANT CHANGE UP (ref 37–73)
NITRITE UR-MCNC: NEGATIVE — SIGNIFICANT CHANGE UP
PH UR: 5 — SIGNIFICANT CHANGE UP (ref 5–8)
PLATELET # BLD AUTO: 171 K/UL — SIGNIFICANT CHANGE UP (ref 150–400)
POTASSIUM SERPL-MCNC: 3.9 MMOL/L — SIGNIFICANT CHANGE UP (ref 3.5–5.3)
POTASSIUM SERPL-SCNC: 3.9 MMOL/L — SIGNIFICANT CHANGE UP (ref 3.5–5.3)
PROT SERPL-MCNC: 6.5 G/DL — LOW (ref 6.6–8.7)
PROT UR-MCNC: 15 MG/DL
RBC # BLD: 4.21 M/UL — LOW (ref 4.4–5.2)
RBC # FLD: 14.4 % — SIGNIFICANT CHANGE UP (ref 11–15.6)
RBC CASTS # UR COMP ASSIST: SIGNIFICANT CHANGE UP /HPF (ref 0–4)
SODIUM SERPL-SCNC: 137 MMOL/L — SIGNIFICANT CHANGE UP (ref 135–145)
SP GR SPEC: 1.01 — SIGNIFICANT CHANGE UP (ref 1.01–1.02)
TROPONIN T SERPL-MCNC: <0.01 NG/ML — SIGNIFICANT CHANGE UP (ref 0–0.06)
UROBILINOGEN FLD QL: 1 MG/DL
WBC # BLD: 10.6 K/UL — SIGNIFICANT CHANGE UP (ref 4.8–10.8)
WBC # FLD AUTO: 10.6 K/UL — SIGNIFICANT CHANGE UP (ref 4.8–10.8)
WBC UR QL: ABNORMAL

## 2019-03-08 PROCEDURE — 93005 ELECTROCARDIOGRAM TRACING: CPT

## 2019-03-08 PROCEDURE — 99284 EMERGENCY DEPT VISIT MOD MDM: CPT

## 2019-03-08 PROCEDURE — 84484 ASSAY OF TROPONIN QUANT: CPT

## 2019-03-08 PROCEDURE — 93010 ELECTROCARDIOGRAM REPORT: CPT

## 2019-03-08 PROCEDURE — 99283 EMERGENCY DEPT VISIT LOW MDM: CPT

## 2019-03-08 PROCEDURE — 36415 COLL VENOUS BLD VENIPUNCTURE: CPT

## 2019-03-08 PROCEDURE — 81001 URINALYSIS AUTO W/SCOPE: CPT

## 2019-03-08 PROCEDURE — 87086 URINE CULTURE/COLONY COUNT: CPT

## 2019-03-08 PROCEDURE — 80053 COMPREHEN METABOLIC PANEL: CPT

## 2019-03-08 PROCEDURE — 85027 COMPLETE CBC AUTOMATED: CPT

## 2019-03-08 RX ORDER — CEPHALEXIN 500 MG
1 CAPSULE ORAL
Qty: 14 | Refills: 0
Start: 2019-03-08

## 2019-03-08 NOTE — ED PROVIDER NOTE - PHYSICAL EXAMINATION
Gen: No acute distress, non toxic  HEENT: Mucous membranes moist, pink conjunctivae, EOMI  CV: RRR, nl s1/s2.  Resp: CTAB, normal rate and effort  GI: Abdomen soft, NT, ND. No rebound, no guarding  : No CVAT  Neuro: A&O x 3, moving all 4 extremities 5/5 strength all extremities. ambulates but with slowly and weaker/needing assistance.  MSK: No spine or joint tenderness to palpation  Skin: No rashes. intact and perfused.

## 2019-03-08 NOTE — ED ADULT NURSE NOTE - OBJECTIVE STATEMENT
Pt BIBA A&ox3 c/o generalized B/L lower extremity weakness. Reports she was using the bathroom and was unable to get up from the toilet. Family states they had to assist her ambulating to her chair. Pt denies any pain or discomfort. Denies any unilateral weakness. Denies LOC or straining to have a BM. No apparent distress noted.  As per daughter pt recently has been hearing voices, denies chest pain or SOB , offers no complaints

## 2019-03-08 NOTE — PROVIDER CONTACT NOTE (OTHER) - SITUATION
CM INFORMED PT IS REQUIRING CHHA AND DME.
PT orders received. Chart reviewed, contents noted. Pt seen in ED for PT evaluation, see consult for details. Pt denies pain pre/post session. Pt left as found, NAD. Daughter present. PT to follow up

## 2019-03-08 NOTE — ED PROVIDER NOTE - CLINICAL SUMMARY MEDICAL DECISION MAKING FREE TEXT BOX
generalized weakness, has improved but still slight difficulty with gait when PT saw pt. small uti, will treat empirically, d/christophe with walker, will get home PT, return precautions. well appearing and feeling nl besides minor residual difficulty walking. daughter living with pt.

## 2019-03-08 NOTE — PROVIDER CONTACT NOTE (OTHER) - ASSESSMENT
CM MET W/ PT AND FAMILY AT BS.  PER PT REC'S, PT NEEDS CH.  CHOICE LIST GIVEN, FAMILY CHOOSING [facility].  REFERRAL MADE VIA ACM.  SOC FOR IN THE AM. PT REQUIRING RW AS WELL.  TAKEN FORM FLOOR STOCK AND DELIVERED TO BEDSIDE.  PT STABLE FOR DC FROM CM STANDPOINT.
PT RECOMMENDATION:  home with home PT/assist; Rolling walker for home.  CCC: Smiley valdes.

## 2019-03-08 NOTE — PHYSICAL THERAPY INITIAL EVALUATION ADULT - LEVEL OF INDEPENDENCE: GAIT, REHAB EVAL
(pt also marched 10x at side of stretcher). pt also ambulated 5 feet without device-unsteady, reaching for chair/contact guard

## 2019-03-08 NOTE — PHYSICAL THERAPY INITIAL EVALUATION ADULT - ADDITIONAL COMMENTS
Pt/daughter report that pt lives with daughter and son-in-law in a private house. 3 steps to enter with rail. None indoors. Pt ambulates household distances, independently. Owns a cane and rollator, but does not use either one. Daughter reports that she has encouraged pt to use cane and pt refuses.

## 2019-03-08 NOTE — ED ADULT NURSE NOTE - INTERVENTIONS DEFINITIONS
[place] to call system/Instruct patient to call for assistance/Call bell, personal items and telephone within reach/Provide visual cue, wrist band, yellow gown, etc./Stretcher in lowest position, wheels locked, appropriate side rails in place/Room bathroom lighting operational/Non-slip footwear when patient is off stretcher/Physically safe environment: no spills, clutter or unnecessary equipment

## 2019-03-08 NOTE — DISCHARGE NOTE ADULT - PATIENT PORTAL LINK FT
You can access the [facility] Patient Portal, offered by [facility], by registering with the following website: [email or web address]

## 2019-03-08 NOTE — PROVIDER CONTACT NOTE (OTHER) - RECOMMENDATIONS
PT GOALS:  Within 1 week:  Bed mobility Independently, transfers and gait modified independently with least restrictive device x 50 feet. Negotiate 3 steps with single rail and supervision.

## 2019-03-08 NOTE — PHYSICAL THERAPY INITIAL EVALUATION ADULT - PERTINENT HX OF CURRENT PROBLEM, REHAB EVAL
86 y/o female presented to ED with c/o generalized weakness bilateral LE's. Required assist to get up from toilet. UA pending

## 2019-03-08 NOTE — ED PROVIDER NOTE - OBJECTIVE STATEMENT
88 y/o female hx macular degeneration c/o generalized weakness today. Went to bathroom, was unable to get up from it. Unable to get up from couch as well. No focal symptoms, no cp, sob, headache, f/c, urinary symptoms. Usual state of health  yesterday, ambualted normally without help. Daughter bedside lives with pt.    ROS: No fever/chills. No eye pain/changes in vision, No ear pain/sore throat/dysphagia, No chest pain/palpitations. No SOB/cough/. No abdominal pain, N/V/D, no black/bloody bm. No dysuria/frequency/discharge, No headache. No Dizziness.    No rashes or breaks in skin. No numbness/tingling/weakness.

## 2019-03-08 NOTE — ED ADULT TRIAGE NOTE - CHIEF COMPLAINT QUOTE
Pt BIBA A&ox3 c/o generalized B/L lower extremity weakness. Reports she was using the bathroom and was unable to get up from the toilet. Family states they had to assist her ambulating to her chair. Pt denies any pain or discomfort. Denies any unilateral weakness. Denies LOC or straining to have a BM. No apparent distress noted.

## 2019-03-08 NOTE — ED ADULT NURSE REASSESSMENT NOTE - NS ED NURSE REASSESS COMMENT FT1
Patient resting in bed, awake, alert and oriented, denies pain, denies nausea, fluids infusing well, awaiting for CT and dispo.

## 2019-03-09 LAB
CULTURE RESULTS: SIGNIFICANT CHANGE UP
SPECIMEN SOURCE: SIGNIFICANT CHANGE UP

## 2019-03-28 ENCOUNTER — FORM ENCOUNTER (OUTPATIENT)
Age: 84
End: 2019-03-28

## 2019-03-28 ENCOUNTER — APPOINTMENT (OUTPATIENT)
Dept: FAMILY MEDICINE | Facility: CLINIC | Age: 84
End: 2019-03-28
Payer: MEDICARE

## 2019-03-28 VITALS
SYSTOLIC BLOOD PRESSURE: 130 MMHG | OXYGEN SATURATION: 97 % | HEART RATE: 56 BPM | RESPIRATION RATE: 12 BRPM | DIASTOLIC BLOOD PRESSURE: 70 MMHG | WEIGHT: 178 LBS | HEIGHT: 64 IN | BODY MASS INDEX: 30.39 KG/M2

## 2019-03-28 PROCEDURE — 99215 OFFICE O/P EST HI 40 MIN: CPT

## 2019-03-28 NOTE — PHYSICAL EXAM
[No Acute Distress] : no acute distress [Normal Oropharynx] : the oropharynx was normal [Clear to Auscultation] : lungs were clear to auscultation bilaterally [Normal S1, S2] : normal S1 and S2 [Soft] : abdomen soft [No CVA Tenderness] : no CVA  tenderness [No Focal Deficits] : no focal deficits [Normal Affect] : the affect was normal

## 2019-03-28 NOTE — REVIEW OF SYSTEMS
[Fever] : no fever [Fatigue] : fatigue [Earache] : no earache [Chest Pain] : no chest pain [Shortness Of Breath] : no shortness of breath [Abdominal Pain] : no abdominal pain [FreeTextEntry2] : sleeps a lot

## 2019-03-28 NOTE — DATA REVIEWED
[FreeTextEntry1] : reviewed hie - urine cs neg\par ronda times 3 neg\par  cbc and chem were ok, \par

## 2019-03-28 NOTE — HISTORY OF PRESENT ILLNESS
[FreeTextEntry1] : Pt is here for follow up UTI.  Pt was TX 3 weeks ago at ER . Pt completed ABX and feeling much better. Pt just needs recheck. [de-identified] : pt here for fu, here w son in llaw, pt was in er w delireum prior an d wand weakness where she could not get up off toilet as per family, she had labs and ekg and ronda and all were neg aside from polymicrobial urine and she was sent home  on abx, however on er review pt was told to take daily for 14 days and somehow was supposed to be keflex po bid for seven days,\par pt had presented to er w weakness where pt could not stand up

## 2019-03-29 ENCOUNTER — APPOINTMENT (OUTPATIENT)
Dept: CT IMAGING | Facility: CLINIC | Age: 84
End: 2019-03-29
Payer: MEDICARE

## 2019-03-29 ENCOUNTER — OUTPATIENT (OUTPATIENT)
Dept: OUTPATIENT SERVICES | Facility: HOSPITAL | Age: 84
LOS: 1 days | End: 2019-03-29
Payer: MEDICARE

## 2019-03-29 DIAGNOSIS — R41.0 DISORIENTATION, UNSPECIFIED: ICD-10-CM

## 2019-03-29 DIAGNOSIS — Z96.653 PRESENCE OF ARTIFICIAL KNEE JOINT, BILATERAL: Chronic | ICD-10-CM

## 2019-03-29 PROCEDURE — 70450 CT HEAD/BRAIN W/O DYE: CPT | Mod: 26

## 2019-03-29 PROCEDURE — 70450 CT HEAD/BRAIN W/O DYE: CPT

## 2019-04-02 LAB — BACTERIA UR CULT: NORMAL

## 2019-05-07 ENCOUNTER — APPOINTMENT (OUTPATIENT)
Dept: FAMILY MEDICINE | Facility: CLINIC | Age: 84
End: 2019-05-07
Payer: MEDICARE

## 2019-05-07 VITALS
SYSTOLIC BLOOD PRESSURE: 112 MMHG | TEMPERATURE: 98.7 F | WEIGHT: 181.25 LBS | BODY MASS INDEX: 30.94 KG/M2 | HEIGHT: 64 IN | DIASTOLIC BLOOD PRESSURE: 71 MMHG | OXYGEN SATURATION: 97 % | RESPIRATION RATE: 18 BRPM | HEART RATE: 62 BPM

## 2019-05-07 DIAGNOSIS — H91.93 UNSPECIFIED HEARING LOSS, BILATERAL: ICD-10-CM

## 2019-05-07 LAB
BILIRUB UR QL STRIP: NORMAL
CLARITY UR: CLEAR
COLLECTION METHOD: NORMAL
GLUCOSE UR-MCNC: NORMAL
HCG UR QL: 1 EU/DL
HGB UR QL STRIP.AUTO: NORMAL
KETONES UR-MCNC: NORMAL
LEUKOCYTE ESTERASE UR QL STRIP: NORMAL
NITRITE UR QL STRIP: NORMAL
PH UR STRIP: 7
PROT UR STRIP-MCNC: NORMAL
SP GR UR STRIP: 1020

## 2019-05-07 PROCEDURE — 36415 COLL VENOUS BLD VENIPUNCTURE: CPT

## 2019-05-07 PROCEDURE — 99214 OFFICE O/P EST MOD 30 MIN: CPT | Mod: 25

## 2019-05-07 PROCEDURE — 81003 URINALYSIS AUTO W/O SCOPE: CPT | Mod: QW

## 2019-05-07 NOTE — HISTORY OF PRESENT ILLNESS
[FreeTextEntry8] : Patient complains of hearing voices at night , which wakes her up from sleep. This began one week ago, and again last night. Patient had a similar episode six weeks ago and patient was diagnosed with a UTI. Symptoms resolved after antibiotic use. Denies abdominal pain, fever, dysuria.\par Patient diet is balanced , but drinks little water.Compliant w/medications.

## 2019-05-07 NOTE — PLAN
[FreeTextEntry1] : Advised to continue medications as directed. Life style and diet modifications were reviewed. Patient must increase water intake on a daily basis. Also, discussed proper hygiene. Explained she must void when she feels the need to urinate and not wait for long periods of time overnight.

## 2019-05-07 NOTE — REVIEW OF SYSTEMS
[Joint Pain] : joint pain [Joint Stiffness] : joint stiffness [Negative] : Heme/Lymph [de-identified] : Auditory Hallucinations

## 2019-05-07 NOTE — PHYSICAL EXAM
[Well Nourished] : well nourished [No Acute Distress] : no acute distress [Well Developed] : well developed [Normal Sclera/Conjunctiva] : normal sclera/conjunctiva [PERRL] : pupils equal round and reactive to light [EOMI] : extraocular movements intact [Normal Outer Ear/Nose] : the outer ears and nose were normal in appearance [Normal Oropharynx] : the oropharynx was normal [No Respiratory Distress] : no respiratory distress  [Clear to Auscultation] : lungs were clear to auscultation bilaterally [Supple] : supple [Normal S1, S2] : normal S1 and S2 [Normal Rate] : normal rate  [Regular Rhythm] : with a regular rhythm [Normal Affect] : the affect was normal [Coordination Grossly Intact] : coordination grossly intact [Normal Insight/Judgement] : insight and judgment were intact

## 2019-05-07 NOTE — COUNSELING
[Healthy eating counseling provided] : healthy eating [Weight management counseling provided] : Weight management [Behavioral health counseling provided] : behavioral health  [None] : None

## 2019-05-08 LAB
ALBUMIN SERPL ELPH-MCNC: 4 G/DL
ALP BLD-CCNC: 112 U/L
ALT SERPL-CCNC: 23 U/L
ANION GAP SERPL CALC-SCNC: 12 MMOL/L
AST SERPL-CCNC: 28 U/L
BASOPHILS # BLD AUTO: 0.02 K/UL
BASOPHILS NFR BLD AUTO: 0.4 %
BILIRUB SERPL-MCNC: 0.3 MG/DL
BUN SERPL-MCNC: 20 MG/DL
CALCIUM SERPL-MCNC: 9.7 MG/DL
CHLORIDE SERPL-SCNC: 109 MMOL/L
CO2 SERPL-SCNC: 22 MMOL/L
CREAT SERPL-MCNC: 0.62 MG/DL
EOSINOPHIL # BLD AUTO: 0.43 K/UL
EOSINOPHIL NFR BLD AUTO: 8 %
GLUCOSE SERPL-MCNC: 132 MG/DL
HCT VFR BLD CALC: 42.9 %
HGB BLD-MCNC: 13.4 G/DL
IMM GRANULOCYTES NFR BLD AUTO: 0.2 %
LYMPHOCYTES # BLD AUTO: 2.34 K/UL
LYMPHOCYTES NFR BLD AUTO: 43.7 %
MAN DIFF?: NORMAL
MCHC RBC-ENTMCNC: 29.6 PG
MCHC RBC-ENTMCNC: 31.2 GM/DL
MCV RBC AUTO: 94.9 FL
MONOCYTES # BLD AUTO: 0.44 K/UL
MONOCYTES NFR BLD AUTO: 8.2 %
NEUTROPHILS # BLD AUTO: 2.12 K/UL
NEUTROPHILS NFR BLD AUTO: 39.5 %
PLATELET # BLD AUTO: 223 K/UL
POTASSIUM SERPL-SCNC: 4.4 MMOL/L
PROT SERPL-MCNC: 6.7 G/DL
RBC # BLD: 4.52 M/UL
RBC # FLD: 14.7 %
SODIUM SERPL-SCNC: 143 MMOL/L
T3 SERPL-MCNC: 108 NG/DL
T4 SERPL-MCNC: 6.9 UG/DL
TSH SERPL-ACNC: 3.71 UIU/ML
WBC # FLD AUTO: 5.36 K/UL

## 2019-05-10 LAB — BACTERIA UR CULT: NORMAL

## 2019-05-14 ENCOUNTER — APPOINTMENT (OUTPATIENT)
Dept: FAMILY MEDICINE | Facility: CLINIC | Age: 84
End: 2019-05-14
Payer: MEDICARE

## 2019-05-14 VITALS
DIASTOLIC BLOOD PRESSURE: 68 MMHG | OXYGEN SATURATION: 97 % | RESPIRATION RATE: 16 BRPM | SYSTOLIC BLOOD PRESSURE: 110 MMHG | HEART RATE: 51 BPM | TEMPERATURE: 97.7 F

## 2019-05-14 VITALS — WEIGHT: 185 LBS | BODY MASS INDEX: 31.58 KG/M2 | HEIGHT: 64 IN

## 2019-05-14 DIAGNOSIS — R26.81 UNSTEADINESS ON FEET: ICD-10-CM

## 2019-05-14 DIAGNOSIS — R44.0 AUDITORY HALLUCINATIONS: ICD-10-CM

## 2019-05-14 DIAGNOSIS — R41.0 DISORIENTATION, UNSPECIFIED: ICD-10-CM

## 2019-05-14 PROCEDURE — 99214 OFFICE O/P EST MOD 30 MIN: CPT

## 2019-05-14 RX ORDER — CEPHALEXIN 500 MG/1
500 CAPSULE ORAL TWICE DAILY
Qty: 14 | Refills: 0 | Status: DISCONTINUED | COMMUNITY
Start: 2019-03-08 | End: 2019-05-14

## 2019-05-15 PROBLEM — R26.81 GAIT INSTABILITY: Status: ACTIVE | Noted: 2017-04-17

## 2019-05-15 PROBLEM — R41.0 DELIRIUM, ACUTE: Status: RESOLVED | Noted: 2019-03-28 | Resolved: 2019-05-15

## 2019-05-15 PROBLEM — R44.0 AUDITORY HALLUCINATION: Status: RESOLVED | Noted: 2019-03-06 | Resolved: 2019-05-15

## 2019-05-15 NOTE — HISTORY OF PRESENT ILLNESS
[Family Member] : family member [FreeTextEntry1] : Patient at office for follow up, had been in 5/7/19 for hearing voices twice earlier that week. Patient  treated for possible UTI. Wants to review results.Stated no new episodes. feeling better since hydrating  better w/water\par Pt is here w/son [de-identified] : Diet is good and drinking water daily. Compliant w/medications.

## 2019-05-16 ENCOUNTER — APPOINTMENT (OUTPATIENT)
Dept: FAMILY MEDICINE | Facility: CLINIC | Age: 84
End: 2019-05-16

## 2019-06-11 ENCOUNTER — OTHER (OUTPATIENT)
Age: 84
End: 2019-06-11

## 2019-06-11 ENCOUNTER — MEDICATION RENEWAL (OUTPATIENT)
Age: 84
End: 2019-06-11

## 2019-08-20 ENCOUNTER — APPOINTMENT (OUTPATIENT)
Dept: FAMILY MEDICINE | Facility: CLINIC | Age: 84
End: 2019-08-20
Payer: MEDICARE

## 2019-08-20 VITALS
SYSTOLIC BLOOD PRESSURE: 120 MMHG | DIASTOLIC BLOOD PRESSURE: 72 MMHG | HEIGHT: 64 IN | HEART RATE: 76 BPM | TEMPERATURE: 98.1 F | WEIGHT: 175 LBS | RESPIRATION RATE: 14 BRPM | OXYGEN SATURATION: 97 % | BODY MASS INDEX: 29.88 KG/M2

## 2019-08-20 DIAGNOSIS — K59.00 CONSTIPATION, UNSPECIFIED: ICD-10-CM

## 2019-08-20 DIAGNOSIS — F32.9 MAJOR DEPRESSIVE DISORDER, SINGLE EPISODE, UNSPECIFIED: ICD-10-CM

## 2019-08-20 LAB
BILIRUB UR QL STRIP: NORMAL
CLARITY UR: CLEAR
GLUCOSE UR-MCNC: NORMAL
HCG UR QL: 0.2 EU/DL
HGB UR QL STRIP.AUTO: NORMAL
KETONES UR-MCNC: NORMAL
LEUKOCYTE ESTERASE UR QL STRIP: NORMAL
NITRITE UR QL STRIP: NORMAL
PH UR STRIP: 5.5
PROT UR STRIP-MCNC: NORMAL
SP GR UR STRIP: 1.02

## 2019-08-20 PROCEDURE — 99214 OFFICE O/P EST MOD 30 MIN: CPT | Mod: 25

## 2019-08-20 PROCEDURE — 81003 URINALYSIS AUTO W/O SCOPE: CPT | Mod: QW

## 2019-08-20 PROCEDURE — G0444 DEPRESSION SCREEN ANNUAL: CPT | Mod: 59

## 2019-08-20 NOTE — PHYSICAL EXAM
[No Focal Deficits] : no focal deficits [Normal Affect] : the affect was normal [Normal Insight/Judgement] : insight and judgment were intact

## 2019-08-20 NOTE — REVIEW OF SYSTEMS
[Constipation] : constipation [Fever] : no fever [Earache] : no earache [Shortness Of Breath] : no shortness of breath [Chest Pain] : no chest pain [de-identified] : see hpi [Abdominal Pain] : no abdominal pain

## 2019-08-20 NOTE — HEALTH RISK ASSESSMENT
[Never (0 pts)] : Never (0 points) [No] : In the past 12 months have you used drugs other than those required for medical reasons? No [No falls in past year] : Patient reported no falls in the past year [1] : 1) Little interest or pleasure doing things for several days (1) [0] : 2) Feeling down, depressed, or hopeless: Not at all (0) [] : No [[id number]] : 0

## 2019-08-20 NOTE — HISTORY OF PRESENT ILLNESS
[FreeTextEntry8] : Pts daughter knows pt has hx of UTI and was concerned about possible AMS episode. Pt states it was a dream .  Pts daughter also notice pt may have episodes of depression . pt last week spent good time in her room , pt is becoming less communicative, pt appearently  was hearing voices,pt notes to lose some weight, pt notes she lost a daughter a few years back, pt notes sometimes feels she needs to be left alone, pt w prior episode of halluicinastion, pt never wentr to neuro, pt is not a med person, pt daughter notes mother is scheduled in couple of days to fly to other daughters house in [place], pt notes no cp, no sob, no abd pain, aside from when she gets constipated, which she gets from time to time. pt admits sometimes she needs to be left alone as hassome sad moments, although this time went on for about one week which was concerning to daughter and her

## 2019-08-21 ENCOUNTER — RX RENEWAL (OUTPATIENT)
Age: 84
End: 2019-08-21

## 2019-10-09 ENCOUNTER — APPOINTMENT (OUTPATIENT)
Dept: FAMILY MEDICINE | Facility: CLINIC | Age: 84
End: 2019-10-09
Payer: MEDICARE

## 2019-10-09 VITALS
HEART RATE: 61 BPM | RESPIRATION RATE: 16 BRPM | DIASTOLIC BLOOD PRESSURE: 80 MMHG | OXYGEN SATURATION: 96 % | SYSTOLIC BLOOD PRESSURE: 128 MMHG

## 2019-10-09 PROCEDURE — 90653 IIV ADJUVANT VACCINE IM: CPT

## 2019-10-09 PROCEDURE — G0008: CPT

## 2019-11-22 ENCOUNTER — APPOINTMENT (OUTPATIENT)
Dept: FAMILY MEDICINE | Facility: CLINIC | Age: 84
End: 2019-11-22
Payer: MEDICARE

## 2019-11-22 VITALS
TEMPERATURE: 98 F | SYSTOLIC BLOOD PRESSURE: 110 MMHG | WEIGHT: 169 LBS | HEART RATE: 65 BPM | HEIGHT: 64 IN | BODY MASS INDEX: 28.85 KG/M2 | DIASTOLIC BLOOD PRESSURE: 80 MMHG | OXYGEN SATURATION: 96 %

## 2019-11-22 DIAGNOSIS — G56.01 CARPAL TUNNEL SYNDROME, RIGHT UPPER LIMB: ICD-10-CM

## 2019-11-22 DIAGNOSIS — F32.9 MAJOR DEPRESSIVE DISORDER, SINGLE EPISODE, UNSPECIFIED: ICD-10-CM

## 2019-11-22 PROCEDURE — G0009: CPT

## 2019-11-22 PROCEDURE — 90732 PPSV23 VACC 2 YRS+ SUBQ/IM: CPT

## 2019-11-22 PROCEDURE — 99214 OFFICE O/P EST MOD 30 MIN: CPT | Mod: 25

## 2019-11-22 NOTE — HISTORY OF PRESENT ILLNESS
[FreeTextEntry8] : Pt c/o numbness/ pins and needles right hand x 2 weeks, pt notes she had  a good stay in [place], pt notes it was hot , pt son in law notes no more hallucinations, pt  notes no cp, no sob, no abd pain\par regarding pins and needles , seems to be worse in am ,

## 2019-11-24 ENCOUNTER — RX RENEWAL (OUTPATIENT)
Age: 84
End: 2019-11-24

## 2020-02-24 ENCOUNTER — EMERGENCY (EMERGENCY)
Facility: HOSPITAL | Age: 85
LOS: 1 days | Discharge: HOME | End: 2020-02-24
Attending: EMERGENCY MEDICINE
Payer: MEDICARE

## 2020-02-24 VITALS
TEMPERATURE: 98 F | HEART RATE: 60 BPM | OXYGEN SATURATION: 95 % | RESPIRATION RATE: 18 BRPM | SYSTOLIC BLOOD PRESSURE: 122 MMHG | DIASTOLIC BLOOD PRESSURE: 79 MMHG

## 2020-02-24 VITALS
TEMPERATURE: 98 F | WEIGHT: 179.02 LBS | OXYGEN SATURATION: 95 % | HEART RATE: 91 BPM | SYSTOLIC BLOOD PRESSURE: 127 MMHG | HEIGHT: 67 IN | DIASTOLIC BLOOD PRESSURE: 79 MMHG | RESPIRATION RATE: 16 BRPM

## 2020-02-24 DIAGNOSIS — F43.21 ADJUSTMENT DISORDER WITH DEPRESSED MOOD: ICD-10-CM

## 2020-02-24 DIAGNOSIS — Z96.653 PRESENCE OF ARTIFICIAL KNEE JOINT, BILATERAL: Chronic | ICD-10-CM

## 2020-02-24 LAB
ALBUMIN SERPL ELPH-MCNC: 4.2 G/DL — SIGNIFICANT CHANGE UP (ref 3.3–5.2)
ALP SERPL-CCNC: 130 U/L — HIGH (ref 40–120)
ALT FLD-CCNC: 17 U/L — SIGNIFICANT CHANGE UP
ANION GAP SERPL CALC-SCNC: 16 MMOL/L — SIGNIFICANT CHANGE UP (ref 5–17)
APAP SERPL-MCNC: <7.5 UG/ML — LOW (ref 10–26)
APPEARANCE UR: ABNORMAL
AST SERPL-CCNC: 24 U/L — SIGNIFICANT CHANGE UP
BASOPHILS # BLD AUTO: 0.02 K/UL — SIGNIFICANT CHANGE UP (ref 0–0.2)
BASOPHILS NFR BLD AUTO: 0.3 % — SIGNIFICANT CHANGE UP (ref 0–2)
BILIRUB SERPL-MCNC: 0.6 MG/DL — SIGNIFICANT CHANGE UP (ref 0.4–2)
BILIRUB UR-MCNC: NEGATIVE — SIGNIFICANT CHANGE UP
BUN SERPL-MCNC: 29 MG/DL — HIGH (ref 8–20)
CALCIUM SERPL-MCNC: 9.6 MG/DL — SIGNIFICANT CHANGE UP (ref 8.6–10.2)
CHLORIDE SERPL-SCNC: 103 MMOL/L — SIGNIFICANT CHANGE UP (ref 98–107)
CO2 SERPL-SCNC: 22 MMOL/L — SIGNIFICANT CHANGE UP (ref 22–29)
COLOR SPEC: YELLOW — SIGNIFICANT CHANGE UP
CREAT SERPL-MCNC: 0.57 MG/DL — SIGNIFICANT CHANGE UP (ref 0.5–1.3)
DIFF PNL FLD: ABNORMAL
EOSINOPHIL # BLD AUTO: 0.07 K/UL — SIGNIFICANT CHANGE UP (ref 0–0.5)
EOSINOPHIL NFR BLD AUTO: 0.9 % — SIGNIFICANT CHANGE UP (ref 0–6)
ETHANOL SERPL-MCNC: <10 MG/DL — SIGNIFICANT CHANGE UP
GLUCOSE SERPL-MCNC: 139 MG/DL — HIGH (ref 70–99)
GLUCOSE UR QL: NEGATIVE MG/DL — SIGNIFICANT CHANGE UP
HCT VFR BLD CALC: 48.5 % — HIGH (ref 34.5–45)
HGB BLD-MCNC: 15 G/DL — SIGNIFICANT CHANGE UP (ref 11.5–15.5)
IMM GRANULOCYTES NFR BLD AUTO: 0.3 % — SIGNIFICANT CHANGE UP (ref 0–1.5)
KETONES UR-MCNC: ABNORMAL
LEUKOCYTE ESTERASE UR-ACNC: ABNORMAL
LYMPHOCYTES # BLD AUTO: 2.71 K/UL — SIGNIFICANT CHANGE UP (ref 1–3.3)
LYMPHOCYTES # BLD AUTO: 34.7 % — SIGNIFICANT CHANGE UP (ref 13–44)
MCHC RBC-ENTMCNC: 29.2 PG — SIGNIFICANT CHANGE UP (ref 27–34)
MCHC RBC-ENTMCNC: 30.9 GM/DL — LOW (ref 32–36)
MCV RBC AUTO: 94.5 FL — SIGNIFICANT CHANGE UP (ref 80–100)
MONOCYTES # BLD AUTO: 0.57 K/UL — SIGNIFICANT CHANGE UP (ref 0–0.9)
MONOCYTES NFR BLD AUTO: 7.3 % — SIGNIFICANT CHANGE UP (ref 2–14)
NEUTROPHILS # BLD AUTO: 4.41 K/UL — SIGNIFICANT CHANGE UP (ref 1.8–7.4)
NEUTROPHILS NFR BLD AUTO: 56.5 % — SIGNIFICANT CHANGE UP (ref 43–77)
NITRITE UR-MCNC: NEGATIVE — SIGNIFICANT CHANGE UP
PH UR: 6 — SIGNIFICANT CHANGE UP (ref 5–8)
PLATELET # BLD AUTO: 234 K/UL — SIGNIFICANT CHANGE UP (ref 150–400)
POTASSIUM SERPL-MCNC: 4.4 MMOL/L — SIGNIFICANT CHANGE UP (ref 3.5–5.3)
POTASSIUM SERPL-SCNC: 4.4 MMOL/L — SIGNIFICANT CHANGE UP (ref 3.5–5.3)
PROT SERPL-MCNC: 7 G/DL — SIGNIFICANT CHANGE UP (ref 6.6–8.7)
PROT UR-MCNC: 100 MG/DL
RBC # BLD: 5.13 M/UL — SIGNIFICANT CHANGE UP (ref 3.8–5.2)
RBC # FLD: 13.4 % — SIGNIFICANT CHANGE UP (ref 10.3–14.5)
SALICYLATES SERPL-MCNC: <0.6 MG/DL — LOW (ref 10–20)
SODIUM SERPL-SCNC: 141 MMOL/L — SIGNIFICANT CHANGE UP (ref 135–145)
SP GR SPEC: 1.02 — SIGNIFICANT CHANGE UP (ref 1.01–1.02)
TSH SERPL-MCNC: 2.04 UIU/ML — SIGNIFICANT CHANGE UP (ref 0.27–4.2)
UROBILINOGEN FLD QL: NEGATIVE MG/DL — SIGNIFICANT CHANGE UP
WBC # BLD: 7.8 K/UL — SIGNIFICANT CHANGE UP (ref 3.8–10.5)
WBC # FLD AUTO: 7.8 K/UL — SIGNIFICANT CHANGE UP (ref 3.8–10.5)

## 2020-02-24 PROCEDURE — 99284 EMERGENCY DEPT VISIT MOD MDM: CPT

## 2020-02-24 PROCEDURE — 90792 PSYCH DIAG EVAL W/MED SRVCS: CPT

## 2020-02-24 PROCEDURE — 93010 ELECTROCARDIOGRAM REPORT: CPT

## 2020-02-24 PROCEDURE — 71045 X-RAY EXAM CHEST 1 VIEW: CPT | Mod: 26

## 2020-02-24 RX ORDER — CEPHALEXIN 500 MG
1 CAPSULE ORAL
Qty: 14 | Refills: 0
Start: 2020-02-24 | End: 2020-03-01

## 2020-02-24 RX ORDER — SODIUM CHLORIDE 9 MG/ML
1000 INJECTION INTRAMUSCULAR; INTRAVENOUS; SUBCUTANEOUS ONCE
Refills: 0 | Status: COMPLETED | OUTPATIENT
Start: 2020-02-24 | End: 2020-02-24

## 2020-02-24 RX ORDER — CEFTRIAXONE 500 MG/1
1000 INJECTION, POWDER, FOR SOLUTION INTRAMUSCULAR; INTRAVENOUS ONCE
Refills: 0 | Status: COMPLETED | OUTPATIENT
Start: 2020-02-24 | End: 2020-02-24

## 2020-02-24 RX ADMIN — CEFTRIAXONE 100 MILLIGRAM(S): 500 INJECTION, POWDER, FOR SOLUTION INTRAMUSCULAR; INTRAVENOUS at 20:02

## 2020-02-24 RX ADMIN — SODIUM CHLORIDE 1000 MILLILITER(S): 9 INJECTION INTRAMUSCULAR; INTRAVENOUS; SUBCUTANEOUS at 15:38

## 2020-02-24 NOTE — ED PROVIDER NOTE - CLINICAL SUMMARY MEDICAL DECISION MAKING FREE TEXT BOX
Patient presented with increased sleepiness and decreased appetite. While patient was in the ED, she endorsed SI with no plan and behavorial health was notified and saw the patient. Medical workout was notable for positive UTI with moderate leukocytes.

## 2020-02-24 NOTE — ED PROVIDER NOTE - OBJECTIVE STATEMENT
Ms. Anderson is a 87YO female with a history of noncompliant depression who presented with her daughter with a chief complaint of " I feel sleepy". The patient was in her normal state of health when several days ago she began having decreased appetite and increased sleepiness after a verbal confrontation with daughter about the whereabouts of one of her other children. The patient's daughter/son in law state that approximately every month, the patient goes into a depression refusing to eat and drink.     At baseline, the patient is able to perform most ADLs, but due to vision and hearing impairments, she does not cook hot food on her own. Ms. Anderson is a 87YO female with a history of noncompliant depression who presented with her daughter with a chief complaint of " I feel sleepy". The patient was in her normal state of health when several days ago she began having decreased appetite and increased sleepiness after becoming emotional concerned about one of her adult children. The patient's daughter/son in law state that approximately every month, the patient goes into a depression refusing to eat and drink. Her PMD has prescribed Sertaline, however, the patient refuses to take it. Because of current state, the patient has felt weak and had trouble walking.     At baseline, the patient is able to perform most ADLs while using a cane, but due to vision and hearing impairments, she does not cook hot food on her own.

## 2020-02-24 NOTE — PHYSICAL THERAPY INITIAL EVALUATION ADULT - PERTINENT HX OF CURRENT PROBLEM, REHAB EVAL
Pt came to ED with depressive episode, not eating or drinking at home and subsequently becoming weaker and unable to ambulate.

## 2020-02-24 NOTE — PHYSICAL THERAPY INITIAL EVALUATION ADULT - ADDITIONAL COMMENTS
Pt lives in a private home with her daughter and son in law, both work full time, pt can be home for up to 10 hours a day without supervision. 4 steps to enter with handrails, 12 steps inside with handrail to basement (per daughter, basement stairs are not needed however, pt navigates them despite being told not to). Pt was independent PTA with RW. Pt owns shower chair, RW and SAC

## 2020-02-24 NOTE — PHYSICAL THERAPY INITIAL EVALUATION ADULT - WEIGHT-BEARING RESTRICTIONS: SIT/STAND, REHAB EVAL
SUBJECTIVE:                                                    Bairon White is a 3 month old male who presents to clinic today with mother because of:    Chief Complaint   Patient presents with     Fever     Cough        HPI:  ENT/Cough Symptoms    Problem started: 1 days ago  Fever: no, tm 100.2  Runny nose: YES  Congestion: YES  Sore Throat: no  Cough: YES-dry  Eye discharge/redness:  no  Ear Pain: ?unsure but wants to make sure no ear infection  Wheeze; no  Sick contacts: None;  Strep exposure: None;  Therapies Tried: Tylenol    Denies ear discharge, breathing issues, vomiting and diarrhea. Eating and drinking well, urination and bm nl and states still very playful and active. Denies any other current medical concerns.    Review of Systems:  Negative for constitutional, eye, ear, nose, throat, skin, respiratory, cardiac and gastrointestinal other than those outlined in the HPI.    PROBLEM LIST:  Patient Active Problem List    Diagnosis Date Noted     Term birth of infant 2016     Priority: Medium      MEDICATIONS:  Current Outpatient Prescriptions   Medication Sig Dispense Refill     simethicone (MYLICON) 40 MG/0.6ML suspension Take 40 mg by mouth 4 times daily as needed for cramping Reported on 4/3/2017        ALLERGIES:  No Known Allergies    Problem list and histories reviewed & adjusted, as indicated.    OBJECTIVE:                                                      Pulse 122  Temp 98.2  F (36.8  C) (Tympanic)  Wt 16 lb 8.5 oz (7.499 kg)  SpO2 99%   No blood pressure reading on file for this encounter.    GENERAL: Active, alert, in no acute distress.Very playful and very well appearing  SKIN: Clear. No significant rash, abnormal pigmentation or lesions  HEAD: Normocephalic. Fontanelles flat and within normal limits   EYES:  No discharge or erythema. Normal pupils and EOM.  EARS: Normal canals. Tympanic membranes are normal; gray and translucent.  NOSE: no discharge seen  MOUTH/THROAT: Clear. No  oral lesions.   LUNGS: Clear to auscultation bilaterally. No rales, rhonchi, wheezing heard or retractions seen  HEART: Regular rhythm. Normal S1/S2. No murmurs.  ABDOMEN: Soft, non-tender, not distended, no masses or hepatosplenomegaly. Bowel sounds normal.     DIAGNOSTICS: None    ASSESSMENT/PLAN:                                                      1. Viral upper respiratory tract infection        FOLLOW UP:   Patient Instructions   1)continue to monitor and educated about reasons to go to the er/see doctor earlier  2)continue the use of a humidifier.  3)continue doing saline/suction as needed.  4)can use an extra pillow to elevate head during bedtime.   5)please avoid any over the counter medications.   6)return to clinic if not improved/resolved and if ok for 4month well child exam      Sara Cronin MD     weight-bearing as tolerated

## 2020-02-24 NOTE — ED PROVIDER NOTE - NS ED ROS FT
REVIEW OF SYSTEMS:    CONSTITUTIONAL: No fevers or chills  EYES/ENT: No visual changes;  No vertigo or throat pain   NECK: No pain or stiffness  RESPIRATORY: No cough, wheezing, hemoptysis; No shortness of breath  CARDIOVASCULAR: No chest pain or palpitations  GASTROINTESTINAL: No abdominal or epigastric pain. No nausea, vomiting, or hematemesis; No diarrhea or constipation. No melena or hematochezia.  GENITOURINARY: No dysuria, frequency or hematuria  NEUROLOGICAL: No numbness or weakness  SKIN: No itching, rashes

## 2020-02-24 NOTE — ED BEHAVIORAL HEALTH ASSESSMENT NOTE - RISK ASSESSMENT
Patient admits to feeling like a burden at times, reports feelings of loneliness, and intermittently depressed mood particularly after death of  in 2013.  She denies any active S/H I/I/P, reports [place] convictions against suicide, denies any S/H I/I/P, denies high psychic anxiety or generalized insomnia, reported willingness to trial SSRI's, Low Acute Suicide Risk

## 2020-02-24 NOTE — ED BEHAVIORAL HEALTH ASSESSMENT NOTE - OTHER PAST PSYCHIATRIC HISTORY (INCLUDE DETAILS REGARDING ONSET, COURSE OF ILLNESS, INPATIENT/OUTPATIENT TREATMENT)
Pt denies any prior history of formal psychiatric treatment.  Pt has no prior inpatient hospitalization.  No prior h/o outpatient treatment and no h/o psychotropic medications.  No prior suicide attempts.  As per daughter has long h/o depressive tendencies that have worsened since death of  in 2013 and daughter in 2015.

## 2020-02-24 NOTE — ED PROVIDER NOTE - PMH
Depression    Hyperlipidemia, unspecified hyperlipidemia type    Hypothyroidism, unspecified type    Macular degeneration

## 2020-02-24 NOTE — ED PROVIDER NOTE - NSFOLLOWUPINSTRUCTIONS_ED_ALL_ED_FT
Take prescription medication as instructed.  Drink fluids to stay hydrated.  Eat a well balanced diet for nourishment.  Follow-up with your primary care doctor.

## 2020-02-24 NOTE — ED PROVIDER NOTE - PATIENT PORTAL LINK FT
You can access the FollowMyHealth Patient Portal offered by [facility] by registering at the following website: [email or web address] By joining [employer]’s FollowMyHealth portal, you will also be able to view your health information using other applications (apps) compatible with our system.

## 2020-02-24 NOTE — PHYSICAL THERAPY INITIAL EVALUATION ADULT - CRITERIA FOR SKILLED THERAPEUTIC INTERVENTIONS
Home with RW, 24/7 supervision recommended 2*2 family reporting pt is unsafe at home and "does things she shouldn't do"/anticipated discharge recommendation

## 2020-02-24 NOTE — ED PROVIDER NOTE - PHYSICAL EXAMINATION
PHYSICAL EXAM:  GENERAL: NAD, lying in bed. visibly sleeping   HEAD:  Atraumatic, Normocephalic  EYES: EOMI, PERRLA, conjunctiva and sclera clear  ENT: Moist mucous membranes  NECK: Supple, No JVD  CHEST/LUNG: Clear to auscultation bilaterally; No rales, rhonchi, wheezing, or rubs. Unlabored respirations  HEART: Regular rate and rhythm; No murmurs, rubs, or gallops  ABDOMEN: Bowel sounds present; Soft, Nontender, Nondistended. No hepatomegally  EXTREMITIES:  2+ Peripheral Pulses, brisk capillary refill. No clubbing, cyanosis, or edema  NERVOUS SYSTEM:  Alert & Oriented X2, speech clear. No deficits Gait exam was deferred.    MSK: generalized weakness but decreased from reported baseline.  SKIN: No rashes or lesions

## 2020-02-24 NOTE — ED BEHAVIORAL HEALTH ASSESSMENT NOTE - HPI (INCLUDE ILLNESS QUALITY, SEVERITY, DURATION, TIMING, CONTEXT, MODIFYING FACTORS, ASSOCIATED SIGNS AND SYMPTOMS)
Patient is an 88 year old, female; domiciled with daughter and son in law; ;  noncaregiver;  with no formal history of psychiatric treatment,  long h/o depression as per family;   no psychiatric  hospitalizations; no known suicide attempts; no known history of violence or arrests; no active substance abuse or known history of complicated withdrawal; PMH of macular degeneration, glaucoma, hypothyroidism, HCL, hearing loss h/o prior TIA ; brought in by family for not eating or drinking x  2 days.        As per family patient has long h/o depression and generally pessimistic outlook on life.  Almost on a monthly basis she isolates in her room for a few days when she gets upset. Patient reportedly got into an argument with her daughter on Friday after patient was worrying when her son was traveling on a business trip and did not call her.   She then stayed in her room last two days and has not been eating or drinking and spending excessive time in bed for two days.  Today daughter observed patient eating a cracker and 1/2 cup of cranberry juice and brought her to ER because of weakness.  Daughter reports that she has a difficult time dealing with mother at home because of her moodiness, and has started looking at assisted living. She does not want an aide at home. She is concerned that mother may fall on the stairs (although this has never occurred) and hopes mother can live w/ brother for a period of time.  She reports patient has made statements at times that she wants to die which is long standing but has never engaged in any dangerous behavior and has no history of active suicidal ideation or prior suicide attempts. Patient is an 88 year old, female; domiciled with daughter and son in law; ;  noncaregiver;  with no formal history of psychiatric treatment,  long h/o depression as per family;   no psychiatric  hospitalizations; no known suicide attempts; no known history of violence or arrests; no active substance abuse or known history of complicated withdrawal; PMH of macular degeneration, glaucoma, hypothyroidism, HCL, hearing loss h/o prior TIA ; brought in by family for not eating or drinking x  2 days.        As per family patient has long h/o depression and generally pessimistic outlook on life.  Almost on a monthly basis she isolates in her room for a few days when she gets upset. Patient reportedly got into an argument with her daughter on Friday after patient was worrying when her son was traveling on a business trip and did not call her.   She then stayed in her room last two days and has not been eating or drinking and spending excessive time in bed for two days.  Today daughter observed patient eating a cracker and 1/2 cup of cranberry juice and brought her to ER because of weakness.  Daughter reports that she has a difficult time dealing with mother at home because of her moodiness, and has started looking at assisted living. She does not want an aide at home. She is concerned that mother may fall on the stairs (although this has never occurred) and hopes mother can live w/ brother for a period of time.  She reports patient has made statements at times that she wants to die which is long standing but has never engaged in any dangerous behavior and has no history of active suicidal ideation or prior suicide attempts. She reports that her PMD prescribed Sertraline 25 mg for depression in August 2019 but patient has not wanted to take medications.   As per family, patient may be starting to have some mild memory problems, but it is not prominent and she does not have problems with ADLs (when she wants) and assists daughter in her bills (although she does not write checks) Patient is an 88 year old, female; domiciled with daughter and son in law; ;  noncaregiver;  with no formal history of psychiatric treatment,  long h/o depression as per family;   no psychiatric  hospitalizations; no known suicide attempts; no known history of violence or arrests; no active substance abuse or known history of complicated withdrawal; PMH of macular degeneration, glaucoma, hypothyroidism, HCL, hearing loss h/o prior TIA ; brought in by family for not eating or drinking x  2 days.        As per family patient has long h/o depression and generally pessimistic outlook on life.  Almost on a monthly basis she isolates in her room for a few days when she gets upset. Patient reportedly got into an argument with her daughter on Friday after patient was worrying when her son was traveling on a business trip and did not call her.   She then stayed in her room last two days and has not been eating or drinking and spending excessive time in bed for two days.  Today daughter observed patient eating a cracker and 1/2 cup of cranberry juice and brought her to ER because of weakness.  Daughter reports that she has a difficult time dealing with mother at home because of her moodiness, and has started looking at assisted living. She does not want an aide at home. She is concerned that mother may fall on the stairs (although this has never occurred) and hopes mother can live w/ brother for a period of time.  She reports patient has made statements at times that she wants to die which is long standing but has never engaged in any dangerous behavior and has no history of active suicidal ideation or prior suicide attempts. She reports that her PMD prescribed Sertraline 25 mg for depression in August 2019 but patient has not wanted to take medications.   As per family, patient may be starting to have some mild memory problems, but it is not prominent and she does not have problems with ADLs (when she wants) and assists daughter in her bills (although she does not write checks).      Patient admits that she was feeling more depressed after argument with her daughter and that's why she was isolating.  She reports feeling that her daughter does not understand that she worries about her children.  She feels sometimes she is a burden to her family and that they want to put her away somewhere.  She admits to having thoughts at times that she does not want to be around but denies any active ideation  intent or plan to end her life.  She states she is going to try eating and drinking again.  She also expressed willingness to try to take Sertraline. Risks, benefits and common side effects were discussed with patient. She does not have any interest in engaging with therapist.  She states "I just need to get out of this myself". She admits to difficulty adapting to her children moving and loss of  in 2013 and daughter in 2015.   Concerning other psychiatric symptoms, pt denies any aggressive or violent behavior towards others. Pt denies any episodes of bizarre happiness, unusual energy, unusual nightime excitation or other common symptoms of nettie. Pt denies hearing voices or seeing things.  No delusions were elicited.  Patient denies pervasive anxiety,  panic attacks, obsessions or compulsions.

## 2020-02-24 NOTE — ED BEHAVIORAL HEALTH ASSESSMENT NOTE - DESCRIPTION
Patient was pleasant, appropriate. She was forthcoming on interview.  She was a little hard of hearing. She was not aggressive or agitated.     Vital Signs Last 24 Hrs  T(C): 36.7 (24 Feb 2020 13:52), Max: 36.7 (24 Feb 2020 13:52)  T(F): 98 (24 Feb 2020 13:52), Max: 98 (24 Feb 2020 13:52)  HR: 91 (24 Feb 2020 13:52) (91 - 91)  BP: 127/79 (24 Feb 2020 13:52) (127/79 - 127/79)  BP(mean): --  RR: 16 (24 Feb 2020 13:52) (16 - 16)  SpO2: 95% (24 Feb 2020 13:52) (95% - 95%) H/o macular degeneration, hearing loss, glaucoma, hypothyroidism, Hypercholesterolemia, h/o TIA  in , one of her 5 children  in .  Has been living with her daughter for the last 3 1/2 years

## 2020-02-24 NOTE — ED ADULT NURSE NOTE - OBJECTIVE STATEMENT
88 year old female a&ox3 comes to ED c/o decreased eating. as per family pt has not been eating, all she does is sleep. pt. states she is not hungry. pt. denies pain or discomfort. abdominal pain. no obvious swelling. pt. ambulates with a cane.

## 2020-02-24 NOTE — ED ADULT NURSE NOTE - CHIEF COMPLAINT QUOTE
Pt arrives from home with EMS who state pt daughter called for 2 days of malaise and not wanting to eat. Pt state she is "tired" and appears lethargic. Color WNL and pt denies pain.

## 2020-02-24 NOTE — PROVIDER CONTACT NOTE (OTHER) - RECOMMENDATIONS
Per family, pt is unsafe, and "does things she shouldn't do" at home. PT recommends 24/7 supervision until assisted living can be arranged (this is family's preference for long term care)

## 2020-02-24 NOTE — PHYSICAL THERAPY INITIAL EVALUATION ADULT - LEVEL OF INDEPENDENCE: STAIR NEGOTIATION, REHAB EVAL
contact guard/supervision/Contact guard ascending for steadying 2*2 weakness, supervision needed to descend for safety

## 2020-02-24 NOTE — ED BEHAVIORAL HEALTH ASSESSMENT NOTE - SUMMARY
Patient with no h/o formal treatment.  She has long h/o depressive tendencies which has worsened since death of  in 2013 and daughter in 2015.  She did admit to passive suicidal ideation at times but denies any h/o active S/H I/I/P, no prior attempts.  Patient with adjustment disorder with depressed mood x 2 days after having argument with daughter. She has not been eating or drinking much in last two days and has been isolative and spending excessive times in bed.  She does have intermittent history of isolative behavior that occur for several days at a times and are often triggered by conflict.   She did respond to support and is reports willingness to trial Sertraline 25 mg daily which had been suggested by PMD but patient had been unwilling to try.  Risks, benefits and common side effects were discussed. Patient reported willingness to restart eating and drinking.  No other acute psychiatric sx's were elicited. She does not warrant inpatient hospitalization.   Family does not have any acute safety concerns

## 2020-02-25 ENCOUNTER — INPATIENT (INPATIENT)
Facility: HOSPITAL | Age: 85
LOS: 2 days | Discharge: HOME | DRG: 280 | End: 2020-02-28
Attending: INTERNAL MEDICINE | Admitting: GENERAL ACUTE CARE HOSPITAL
Payer: MEDICARE

## 2020-02-25 VITALS
HEART RATE: 64 BPM | OXYGEN SATURATION: 97 % | SYSTOLIC BLOOD PRESSURE: 120 MMHG | DIASTOLIC BLOOD PRESSURE: 70 MMHG | TEMPERATURE: 98 F | RESPIRATION RATE: 20 BRPM | HEIGHT: 67 IN

## 2020-02-25 DIAGNOSIS — R42 DIZZINESS AND GIDDINESS: ICD-10-CM

## 2020-02-25 DIAGNOSIS — Z96.653 PRESENCE OF ARTIFICIAL KNEE JOINT, BILATERAL: Chronic | ICD-10-CM

## 2020-02-25 LAB
ALBUMIN SERPL ELPH-MCNC: 4.1 G/DL — SIGNIFICANT CHANGE UP (ref 3.3–5.2)
ALP SERPL-CCNC: 131 U/L — HIGH (ref 40–120)
ALT FLD-CCNC: 21 U/L — SIGNIFICANT CHANGE UP
ANION GAP SERPL CALC-SCNC: 15 MMOL/L — SIGNIFICANT CHANGE UP (ref 5–17)
AST SERPL-CCNC: 48 U/L — HIGH
BASOPHILS # BLD AUTO: 0.02 K/UL — SIGNIFICANT CHANGE UP (ref 0–0.2)
BASOPHILS NFR BLD AUTO: 0.2 % — SIGNIFICANT CHANGE UP (ref 0–2)
BILIRUB SERPL-MCNC: 0.5 MG/DL — SIGNIFICANT CHANGE UP (ref 0.4–2)
BLD GP AB SCN SERPL QL: SIGNIFICANT CHANGE UP
BUN SERPL-MCNC: 21 MG/DL — HIGH (ref 8–20)
CALCIUM SERPL-MCNC: 9.5 MG/DL — SIGNIFICANT CHANGE UP (ref 8.6–10.2)
CHLORIDE SERPL-SCNC: 106 MMOL/L — SIGNIFICANT CHANGE UP (ref 98–107)
CO2 SERPL-SCNC: 20 MMOL/L — LOW (ref 22–29)
CREAT SERPL-MCNC: 0.61 MG/DL — SIGNIFICANT CHANGE UP (ref 0.5–1.3)
EOSINOPHIL # BLD AUTO: 0.05 K/UL — SIGNIFICANT CHANGE UP (ref 0–0.5)
EOSINOPHIL NFR BLD AUTO: 0.4 % — SIGNIFICANT CHANGE UP (ref 0–6)
GLUCOSE SERPL-MCNC: 138 MG/DL — HIGH (ref 70–99)
HCT VFR BLD CALC: 46.9 % — HIGH (ref 34.5–45)
HGB BLD-MCNC: 15.1 G/DL — SIGNIFICANT CHANGE UP (ref 11.5–15.5)
IMM GRANULOCYTES NFR BLD AUTO: 0.3 % — SIGNIFICANT CHANGE UP (ref 0–1.5)
LIDOCAIN IGE QN: 14 U/L — LOW (ref 22–51)
LYMPHOCYTES # BLD AUTO: 18 % — SIGNIFICANT CHANGE UP (ref 13–44)
LYMPHOCYTES # BLD AUTO: 2 K/UL — SIGNIFICANT CHANGE UP (ref 1–3.3)
MCHC RBC-ENTMCNC: 30.3 PG — SIGNIFICANT CHANGE UP (ref 27–34)
MCHC RBC-ENTMCNC: 32.2 GM/DL — SIGNIFICANT CHANGE UP (ref 32–36)
MCV RBC AUTO: 94.2 FL — SIGNIFICANT CHANGE UP (ref 80–100)
MONOCYTES # BLD AUTO: 0.73 K/UL — SIGNIFICANT CHANGE UP (ref 0–0.9)
MONOCYTES NFR BLD AUTO: 6.6 % — SIGNIFICANT CHANGE UP (ref 2–14)
NEUTROPHILS # BLD AUTO: 8.29 K/UL — HIGH (ref 1.8–7.4)
NEUTROPHILS NFR BLD AUTO: 74.5 % — SIGNIFICANT CHANGE UP (ref 43–77)
PLATELET # BLD AUTO: 212 K/UL — SIGNIFICANT CHANGE UP (ref 150–400)
POTASSIUM SERPL-MCNC: 4 MMOL/L — SIGNIFICANT CHANGE UP (ref 3.5–5.3)
POTASSIUM SERPL-SCNC: 4 MMOL/L — SIGNIFICANT CHANGE UP (ref 3.5–5.3)
PROT SERPL-MCNC: 6.8 G/DL — SIGNIFICANT CHANGE UP (ref 6.6–8.7)
RBC # BLD: 4.98 M/UL — SIGNIFICANT CHANGE UP (ref 3.8–5.2)
RBC # FLD: 13.3 % — SIGNIFICANT CHANGE UP (ref 10.3–14.5)
SODIUM SERPL-SCNC: 141 MMOL/L — SIGNIFICANT CHANGE UP (ref 135–145)
TROPONIN T SERPL-MCNC: 0.62 NG/ML — HIGH (ref 0–0.06)
TROPONIN T SERPL-MCNC: 0.64 NG/ML — HIGH (ref 0–0.06)
WBC # BLD: 11.12 K/UL — HIGH (ref 3.8–10.5)
WBC # FLD AUTO: 11.12 K/UL — HIGH (ref 3.8–10.5)

## 2020-02-25 PROCEDURE — 70450 CT HEAD/BRAIN W/O DYE: CPT | Mod: 26

## 2020-02-25 PROCEDURE — 99223 1ST HOSP IP/OBS HIGH 75: CPT

## 2020-02-25 PROCEDURE — 71045 X-RAY EXAM CHEST 1 VIEW: CPT | Mod: 26

## 2020-02-25 PROCEDURE — 93306 TTE W/DOPPLER COMPLETE: CPT | Mod: 26

## 2020-02-25 PROCEDURE — 93010 ELECTROCARDIOGRAM REPORT: CPT | Mod: 76

## 2020-02-25 PROCEDURE — 99291 CRITICAL CARE FIRST HOUR: CPT

## 2020-02-25 RX ORDER — LEVOTHYROXINE SODIUM 125 MCG
1 TABLET ORAL
Qty: 0 | Refills: 0 | DISCHARGE

## 2020-02-25 RX ORDER — LISINOPRIL 2.5 MG/1
2.5 TABLET ORAL DAILY
Refills: 0 | Status: DISCONTINUED | OUTPATIENT
Start: 2020-02-25 | End: 2020-02-27

## 2020-02-25 RX ORDER — ASPIRIN/CALCIUM CARB/MAGNESIUM 324 MG
81 TABLET ORAL DAILY
Refills: 0 | Status: DISCONTINUED | OUTPATIENT
Start: 2020-02-25 | End: 2020-02-28

## 2020-02-25 RX ORDER — ASPIRIN/CALCIUM CARB/MAGNESIUM 324 MG
325 TABLET ORAL ONCE
Refills: 0 | Status: COMPLETED | OUTPATIENT
Start: 2020-02-25 | End: 2020-02-25

## 2020-02-25 RX ORDER — DORZOLAMIDE HYDROCHLORIDE 20 MG/ML
1 SOLUTION/ DROPS OPHTHALMIC EVERY 8 HOURS
Refills: 0 | Status: DISCONTINUED | OUTPATIENT
Start: 2020-02-25 | End: 2020-02-28

## 2020-02-25 RX ORDER — CEFTRIAXONE 500 MG/1
1000 INJECTION, POWDER, FOR SOLUTION INTRAMUSCULAR; INTRAVENOUS ONCE
Refills: 0 | Status: COMPLETED | OUTPATIENT
Start: 2020-02-25 | End: 2020-02-25

## 2020-02-25 RX ORDER — METOPROLOL TARTRATE 50 MG
25 TABLET ORAL
Refills: 0 | Status: DISCONTINUED | OUTPATIENT
Start: 2020-02-25 | End: 2020-02-27

## 2020-02-25 RX ORDER — DORZOLAMIDE HYDROCHLORIDE 20 MG/ML
1 SOLUTION/ DROPS OPHTHALMIC
Qty: 0 | Refills: 0 | DISCHARGE

## 2020-02-25 RX ORDER — ACETAMINOPHEN 500 MG
650 TABLET ORAL EVERY 6 HOURS
Refills: 0 | Status: DISCONTINUED | OUTPATIENT
Start: 2020-02-25 | End: 2020-02-28

## 2020-02-25 RX ORDER — CLOPIDOGREL BISULFATE 75 MG/1
300 TABLET, FILM COATED ORAL ONCE
Refills: 0 | Status: COMPLETED | OUTPATIENT
Start: 2020-02-25 | End: 2020-02-25

## 2020-02-25 RX ORDER — ONDANSETRON 8 MG/1
4 TABLET, FILM COATED ORAL EVERY 6 HOURS
Refills: 0 | Status: DISCONTINUED | OUTPATIENT
Start: 2020-02-25 | End: 2020-02-28

## 2020-02-25 RX ORDER — ENOXAPARIN SODIUM 100 MG/ML
60 INJECTION SUBCUTANEOUS ONCE
Refills: 0 | Status: COMPLETED | OUTPATIENT
Start: 2020-02-25 | End: 2020-02-26

## 2020-02-25 RX ORDER — SODIUM CHLORIDE 9 MG/ML
1000 INJECTION INTRAMUSCULAR; INTRAVENOUS; SUBCUTANEOUS
Refills: 0 | Status: DISCONTINUED | OUTPATIENT
Start: 2020-02-25 | End: 2020-02-28

## 2020-02-25 RX ORDER — ONDANSETRON 8 MG/1
8 TABLET, FILM COATED ORAL ONCE
Refills: 0 | Status: COMPLETED | OUTPATIENT
Start: 2020-02-25 | End: 2020-02-25

## 2020-02-25 RX ORDER — CEFTRIAXONE 500 MG/1
1000 INJECTION, POWDER, FOR SOLUTION INTRAMUSCULAR; INTRAVENOUS EVERY 24 HOURS
Refills: 0 | Status: DISCONTINUED | OUTPATIENT
Start: 2020-02-25 | End: 2020-02-27

## 2020-02-25 RX ORDER — LANOLIN ALCOHOL/MO/W.PET/CERES
5 CREAM (GRAM) TOPICAL AT BEDTIME
Refills: 0 | Status: DISCONTINUED | OUTPATIENT
Start: 2020-02-25 | End: 2020-02-28

## 2020-02-25 RX ORDER — SODIUM CHLORIDE 9 MG/ML
1000 INJECTION INTRAMUSCULAR; INTRAVENOUS; SUBCUTANEOUS ONCE
Refills: 0 | Status: COMPLETED | OUTPATIENT
Start: 2020-02-25 | End: 2020-02-25

## 2020-02-25 RX ORDER — LEVOTHYROXINE SODIUM 125 MCG
25 TABLET ORAL DAILY
Refills: 0 | Status: DISCONTINUED | OUTPATIENT
Start: 2020-02-25 | End: 2020-02-28

## 2020-02-25 RX ORDER — SERTRALINE 25 MG/1
25 TABLET, FILM COATED ORAL DAILY
Refills: 0 | Status: DISCONTINUED | OUTPATIENT
Start: 2020-02-25 | End: 2020-02-28

## 2020-02-25 RX ORDER — ATORVASTATIN CALCIUM 80 MG/1
10 TABLET, FILM COATED ORAL AT BEDTIME
Refills: 0 | Status: DISCONTINUED | OUTPATIENT
Start: 2020-02-25 | End: 2020-02-26

## 2020-02-25 RX ADMIN — SODIUM CHLORIDE 65 MILLILITER(S): 9 INJECTION INTRAMUSCULAR; INTRAVENOUS; SUBCUTANEOUS at 21:45

## 2020-02-25 RX ADMIN — Medication 325 MILLIGRAM(S): at 21:05

## 2020-02-25 RX ADMIN — DORZOLAMIDE HYDROCHLORIDE 1 DROP(S): 20 SOLUTION/ DROPS OPHTHALMIC at 21:50

## 2020-02-25 RX ADMIN — ONDANSETRON 8 MILLIGRAM(S): 8 TABLET, FILM COATED ORAL at 17:49

## 2020-02-25 RX ADMIN — CEFTRIAXONE 100 MILLIGRAM(S): 500 INJECTION, POWDER, FOR SOLUTION INTRAMUSCULAR; INTRAVENOUS at 17:49

## 2020-02-25 RX ADMIN — CLOPIDOGREL BISULFATE 300 MILLIGRAM(S): 75 TABLET, FILM COATED ORAL at 21:28

## 2020-02-25 RX ADMIN — SODIUM CHLORIDE 1000 MILLILITER(S): 9 INJECTION INTRAMUSCULAR; INTRAVENOUS; SUBCUTANEOUS at 17:49

## 2020-02-25 NOTE — H&P ADULT - ASSESSMENT
89 y/o female with PMH of hypothyroidism, depression, came to the ED s/p fall. Patient said she was going to the the living room after making sandwich for lunch in the kitchen when all of a sudden her legs gave up on her and she fell to the floor, not hitting her head; she was unable to get up so she laid on the floor until her daughter came back home. She was on the floor for about 2 hours. Patient was able to recall the events. On getting to the ED, she reported feeling nauseous and vomited once. Of note, she was seen in the ED a day prior for decrease PO intake and UT; discharged home on Sertraline and antibiotic which she said she has started using. Found to have elevated troponin in the ED     NSTEMI   Admit to telemetry   Troponin: 0.64--->0.62  Plavix 300mg and Aspirin 325mg once given   Lovenox 60mg once also given by cardiology   Continue Aspirin 81mg   BB and statin   TTE done   Cardiology on board, planned to do cardiac cath in AM   Trend CE     Mechanical fall  CT head: unremarkable   PT evaluation   SW consult; family leaning towards assisted living for patient     UTI   Ceftriaxone     Hypothyroidism   Continue Synthroid 25mcg     Depression   Sertraline 25mg     Supportive   DVT prophylaxis: Lovenox   Diet: NPO after midnight

## 2020-02-25 NOTE — ED ADULT NURSE REASSESSMENT NOTE - NS ED NURSE REASSESS COMMENT FT1
Patient  received from dayshift RN. Assumed patient care from RN. Patient appears age appropriate. well nourished. normal affect, pleasant mood in  no distress. Patient resting on stretcher. Pt AxO4, VSS, respirations CTA BL, no wheeze/stridor/Rhonchi/Crackles. Not on supplemental O2. Able to speak in full sentences without distress. Cardio NSR on cardiac monitor , S1S2, Regular, rate and Rhythm. ABD- soft/non-tender w/ light palpation/ Non distended, normal BSx4 quadrants. No guarding/ rebound tenderness. Skin c/d/I. IV insertion site  20  gauge noted at RAC, flushing without difficulty. Safety measures taken, bed in low position, call bell within reach, side rails up x2. Plan of care explained. Pt verbalized understanding. Will continue to monitor.

## 2020-02-25 NOTE — CONSULT NOTE ADULT - ASSESSMENT
88 year old female w depression, glaucoma, hearing loss, HLD, hypothyroid, macular degeneration returned to ED today by ambulance for evaluation after syncope and fall, weakness and fatigue      #Syncope  #mechanical fall first encounter  #elevated troponins  1. admit to telemetry  2. serial enzymes  3. echo being done now in ED  4. cardiology consulted  5. ASA 81 mg  6. atorvastatin    #UTI  abn UA yesterday  1. ceftriaxone 1 gram q 24 hrs    #Depression  1. start sertraline 25 mg q HS    #Glaucoma  1. eye drops both eyes    #Hypothyroid  1. check TSH 88 year old female w depression, glaucoma, hearing loss, HLD, hypothyroid, macular degeneration returned to ED today by ambulance for evaluation after syncope and fall, weakness and fatigue      #Syncope  #mechanical fall first encounter  #elevated troponins  1. admit to telemetry  2. serial enzymes  3. echo being done now in ED  4. cardiology consulted  5. ASA 81 mg  6. atorvastatin 10 mg    #UTI  abn UA yesterday  1. ceftriaxone 1 gram q 24 hrs    #Depression  1. start sertraline 25 mg q HS    #Glaucoma  1. dorzolamide 2% eye drops both eyes    #Hypothyroid  1. check TSH  2. levothyroxine 25 mcg

## 2020-02-25 NOTE — H&P ADULT - HISTORY OF PRESENT ILLNESS
87 y/o female with PMH of hypothyroidism, depression, came to the ED s/p fall. Patient said she was going to the the living room after making sandwich for lunch in the kitchen when all of a sudden her legs gave up on her and she fell to the floor, not hitting her head; she was unable to get up so she laid on the floor until her daughter came back home. She was on the floor for about 2 hours. Patient was able to recall the events. On getting to the ED, she reported feeling nauseous and vomited once. Of note, she was seen in the ED a day prior for decrease PO intake and UT; discharged home on Sertraline and antibiotic which she said she has started using. She has no fever, chills, tongue bite, bowel/bladder incontinence, HA, weakness, paresthesia, chest pain, palpitation, shortness of breath.

## 2020-02-25 NOTE — ED ADULT TRIAGE NOTE - CHIEF COMPLAINT QUOTE
Pt BIBA from home for pre-syncopal episode without LOC, pt fell to ground, denies hitting head. Pt states she remembers getting dizzy and weak and falling, unable to get up after falling.

## 2020-02-25 NOTE — H&P ADULT - NEUROLOGICAL DETAILS
sensation intact/alert and oriented x 3/cranial nerves intact/responds to verbal commands/normal strength

## 2020-02-25 NOTE — ED PROVIDER NOTE - CLINICAL SUMMARY MEDICAL DECISION MAKING FREE TEXT BOX
The patient presents with dizziness and generalized weakness and positive troponin and will admit for further evaluation

## 2020-02-25 NOTE — H&P ADULT - NSICDXPASTMEDICALHX_GEN_ALL_CORE_FT
PAST MEDICAL HISTORY:  Depression     Hyperlipidemia, unspecified hyperlipidemia type     Hypothyroidism, unspecified type     Macular degeneration

## 2020-02-25 NOTE — ED PROVIDER NOTE - SECONDARY DIAGNOSIS.
Hypothyroidism, unspecified type Hyperlipidemia, unspecified hyperlipidemia type Troponin level elevated

## 2020-02-25 NOTE — ED ADULT NURSE NOTE - OBJECTIVE STATEMENT
Assumed patient care at 1710. Dr Keith ordered Priority CT. Pt A&OX3 BIB children for being found on floor with house disheveld. Last seen around noon sitting in chair. Denies anti coagulant use, unsure loc or hit head. Pt was here yesterday and dx'd with UTI. Took first dose of po abx this am. Pt denies any pain/discomfort. Sensation intact, RICK x4, PERRL, no numbness/tingling. Pt believes she fell earlier while walking in house

## 2020-02-25 NOTE — CONSULT NOTE ADULT - ASSESSMENT
Mrs. Faulkner had fallen about 1 pm today and was on the floor for about 4 hours. She thinks that she loss consciousness. CT head was reviewed. Pt denies having any cp or sob but his EKG is changed since 2/2/4. TTE demonstrated severely diminished LVEF with evidence of LAD disease, wrap around LAD vs multivessel disease. There is now Q wave in v1 and v2. Degree of cardiomyopathy is worst than troponin leak. She is treated for NSTEMI. Recycle CE. As long as she has no cardiac symptoms, we will plan for cath in am. Low dose BB and ACEI was added.  Pt was given plavix 300 mg as well as lovenox and lipitor.   I will follow with you.

## 2020-02-25 NOTE — CONSULT NOTE ADULT - ATTENDING COMMENTS
VTE SCD  med rec done w pt's daughter; for dose of levothyroxine 25 mg confirmed w Richard [phone] at 20:00  contacted PCP service re admission 20:12      Follow up  discussions should occur w pt's daughter and son

## 2020-02-25 NOTE — ED PROVIDER NOTE - CHPI ED SYMPTOMS NEG
no syncope/no chest pain/no cough/no shortness of breath/no back pain/no fever/no chills/no diaphoresis

## 2020-02-25 NOTE — ED PROVIDER NOTE - OBJECTIVE STATEMENT
The patient is a 88 year old female presents with dizziness and generalized weakness and vomiting.  No CP, No SOB, No abd pain, No back pain, No motor No sensory loss  The patient was diagnosed to have UTI yesterday and sent home

## 2020-02-25 NOTE — ED ADULT NURSE NOTE - NSIMPLEMENTINTERV_GEN_ALL_ED
Implemented All Fall with Harm Risk Interventions:  Longs to call system. Call bell, personal items and telephone within reach. Instruct patient to call for assistance. Room bathroom lighting operational. Non-slip footwear when patient is off stretcher. Physically safe environment: no spills, clutter or unnecessary equipment. Stretcher in lowest position, wheels locked, appropriate side rails in place. Provide visual cue, wrist band, yellow gown, etc. Monitor gait and stability. Monitor for mental status changes and reorient to person, place, and time. Review medications for side effects contributing to fall risk. Reinforce activity limits and safety measures with patient and family. Provide visual clues: red socks.

## 2020-02-25 NOTE — ED PROVIDER NOTE - CARE PLAN
Principal Discharge DX:	Dizziness  Secondary Diagnosis:	Hypothyroidism, unspecified type  Secondary Diagnosis:	Hyperlipidemia, unspecified hyperlipidemia type  Secondary Diagnosis:	Troponin level elevated

## 2020-02-25 NOTE — CONSULT NOTE ADULT - SUBJECTIVE AND OBJECTIVE BOX
88 year old female w depression, glaucoma, hearing loss, HLD, hypothyroid, macular degeneration returned to ED today by ambulance for evaluation after syncope and fall      Pt was seen in the ED yesterday for increased fatigue and not eating x 2 days  Has a hx of depression and was did not take sertraline since it was prescribed in   She was evaluated by PT and was able to use her rolling walker.  Seen by behavioral health.  Diagnosed w UTI and prescribed oral ABx after IV 2 L NS    Today she was last seen normal at 13:00  Was found by her daughter on the floor at home at 15:45  Pt reported that she felt dizzy and passed out. Did not hit head Was too weak to get herself up from the floor  Turkey sandwich that pt made at 14:00 was on the table uneaten  + associated nausea    NOTE: Daughter and emr provided hx as pt had difficulty hearing me on camera    In ED on arrival, VSS on review  EKG NSR 61  qS V1+V2 septal infarct age indeterminate    PCP Dr. Hiram Lopez [phone]    PAST MEDICAL HX  Depression   Glaucoma  Hearing loss   Hyperlipidemia, unspecified hyperlipidemia type    Hypothyroidism, unspecified type    Macular degeneration  TIA  UTI in past    PAST SURGICAL HX  Cataract surgery  H/O total knee replacement, bilateral  Lumpectomy for cyst  Surgery for tubal pregnancy    FAMILY HX  neg for CAD, CVA, DM, HTN, thyroid     SOCIAL HX   2013  Lives w daughter and son-in-law  Nonsmoker      ROS  GEN + fatigue and weakness  RESP no SOB  CARD + dizziness + syncope no chest pain  GI  +Nausea no vomiting  NEURO  + dizziness, syncope          T(C): 36.4 (20 @ 16:34), Max: 36.4 (20 @ 16:34)  HR: 64 (20 @ 16:34) (64 - 64)  BP: 120/70 (20 @ 16:34) (120/70 - 120/70)  RR: 20 (20 @ 16:34) (20 - 20)  SpO2: 97% (20 @ 16:34) (97% - 97%)    PHYSICAL EXAM: evaluation precludes physical exam. Pertinent physical exam findings as per video conference with  teleNA at bedside is as follows:    pleasant female appears in NAD after IV med for nausea    CARDIAC MARKERS ( 2020 17:51 )  x     / 0.64 ng/mL / x     / x     / x                                15.1   11.12 )-----------( 212      ( 2020 17:51 )             46.9     2020 17:51    141    |  106    |  21.0   ----------------------------<  138    4.0     |  20.0   |  0.61     Ca    9.5        2020 17:51    TPro  6.8    /  Alb  4.1    /  TBili  0.5    /  DBili  x      /  AST  48     /  ALT  21     /  AlkPhos  131    2020 17:51      CAPILLARY BLOOD GLUCOSE      POCT Blood Glucose.: 125 mg/dL (2020 16:32)    LIVER FUNCTIONS - ( 2020 17:51 )  Alb: 4.1 g/dL / Pro: 6.8 g/dL / ALK PHOS: 131 U/L / ALT: 21 U/L / AST: 48 U/L / GGT: x           Urinalysis Basic - ( 2020 17:50 )    Color: Yellow / Appearance: Slightly Turbid / S.025 / pH: x  Gluc: x / Ketone: Large  / Bili: Negative / Urobili: Negative mg/dL   Blood: x / Protein: 100 mg/dL / Nitrite: Negative   Leuk Esterase: Moderate / RBC: 0-2 /HPF / WBC >50   Sq Epi: x / Non Sq Epi: Moderate / Bacteria: Moderate       EKG NSR 61  qS V1+V2 septal infarct age indeterminate      RADIOLOGY  EXAM:  CT BRAIN                          PROCEDURE DATE:  2020          INTERPRETATION:  CLINICAL INDICATION: Dizziness.    TECHNIQUE: CT of the head was performed without the administration of intravenous contrast.    COMPARISON: CT head 3/29/2019.    FINDINGS:    There is no evidence of acute infarction, intracranial hemorrhage or mass lesion.  There is hypoattenuation of the subcortical and periventricular white matter, which is nonspecific finding, but most likely represents sequela of moderate chronic microvascular ischemic disease. There are atherosclerotic calcifications of the cavernous and supraclinoid internal carotid arteries bilaterally. There is prominence of the cortical sulci related to underlying brain parenchymal volume loss.    There is no evidence of hydrocephalus. There are no extra-axial fluid collections.    The patient is status post bilateral lens replacement. Visualized intraorbital contents are otherwise unremarkable. The imaged portions of the paranasal sinuses are aerated. The mastoid air cells are clear. The visualized soft tissues and osseous structures appear unremarkable.      IMPRESSION:    No acute intracranial findings.        EXAM:  XR CHEST PORTABLE URGENT 1V                          PROCEDURE DATE:  2020          INTERPRETATION:  Portable chest radiograph        CLINICAL INFORMATION: Weakness    TECHNIQUE:  Portable  AP view of the chest was obtained.    COMPARISON: 2018 available for review.    FINDINGS:    The lungs  are clear.  No pleural abnormality is seen.    The heart and mediastinum are within normal limits.    Visualized osseous structures are intact.        IMPRESSION:   No evidence of active chest disease.
Patient is a 88y old  Female who presents with a chief complaint of same (2020 19:05)    HPI:  PT was seen and examined, daughter and son are at bedside.  Very pleasant female who comes back to ED s/p fall and syncope. She was at [facility] on 2020 for poor appetite and depression. She was at home, her feet gave out, she thinks she lost consciousness while in the kitchen. She dragged herself to living room and remained on the carpet until daughter came home. She has no chest pain or shortness of breath. She denied having any palpitations. This is the first episode of syncope.  She fell once more last summer due to a mechanical fall.   Her Hx is significant for TIA, 2018.  TTE in 2018 demonstrated LVEF.   Pt also has UTI. She was nauseous on presentation to the hospital but during examination, she denied any nausea or vomiting.     PAST MEDICAL & SURGICAL HISTORY:  Depression  Hyperlipidemia, unspecified hyperlipidemia type  Hypothyroidism, unspecified type  Macular degeneration  H/O total knee replacement, bilateral    Allergies  No Known Allergies    MEDICATIONS  (STANDING):  aspirin enteric coated 81 milliGRAM(s) Oral daily  atorvastatin 10 milliGRAM(s) Oral at bedtime  cefTRIAXone   IVPB 1000 milliGRAM(s) IV Intermittent every 24 hours  dorzolamide 2% Ophthalmic Solution 1 Drop(s) Both EYES every 8 hours  enoxaparin Injectable 60 milliGRAM(s) SubCutaneous once  levothyroxine 25 MICROGram(s) Oral daily  lisinopril 2.5 milliGRAM(s) Oral daily  melatonin 5 milliGRAM(s) Oral at bedtime  metoprolol tartrate 25 milliGRAM(s) Oral two times a day  sertraline 25 milliGRAM(s) Oral daily  sodium chloride 0.9%. 1000 milliLiter(s) (65 mL/Hr) IV Continuous <Continuous>    MEDICATIONS  (PRN):  acetaminophen   Tablet .. 650 milliGRAM(s) Oral every 6 hours PRN Temp greater or equal to 38C (100.4F), Mild Pain (1 - 3)  ondansetron Injectable 4 milliGRAM(s) IV Push every 6 hours PRN Nausea      FAMILY HISTORY:  No pertinent family history in first degree relatives      SOCIAL HISTORY: no tobacco, etoh or drug use.       REVIEW OF SYSTEMS:  CONSTITUTIONAL: No fever, weight loss, + fatigue  EYES: No eye pain, visual disturbances, or discharge  ENMT:  + difficulty hearing  NECK: No pain or stiffness  RESPIRATORY: No cough, wheezing, chills or hemoptysis; No Shortness of Breath  CARDIOVASCULAR: No chest pain, palpitations, passing out, dizziness, or leg swelling  GASTROINTESTINAL: No abdominal or epigastric pain. No nausea, vomiting, or hematemesis; No diarrhea or constipation. No melena or hematochezia.  GENITOURINARY: No dysuria, frequency, hematuria, or incontinence  NEUROLOGICAL: No headaches, memory loss, loss of strength, numbness, or tremors  SKIN: No itching, burning, rashes, or lesions   LYMPH Nodes: No enlarged glands  ENDOCRINE: No heat or cold intolerance; No hair loss  MUSCULOSKELETAL: No joint pain or swelling; No muscle, back, or extremity pain  PSYCHIATRIC: No depression, anxiety, mood swings, or difficulty sleeping  HEME/LYMPH: No easy bruising, or bleeding gums  ALLERY AND IMMUNOLOGIC: No hives or eczema	    Vital Signs Last 24 Hrs  T(C): 36.5 (2020 19:37), Max: 36.5 (2020 19:37)  T(F): 97.7 (2020 19:37), Max: 97.7 (2020 19:37)  HR: 79 (2020 19:37) (64 - 79)  BP: 130/80 (2020 19:37) (120/70 - 130/80)  BP(mean): --  RR: 18 (2020 19:37) (18 - 20)  SpO2: 98% (2020 19:37) (97% - 98%  PHYSICAL EXAM:  Appearance: Normal, well nourished	  HEENT:   Normal oral mucosa, PERRL, EOMI, sclera non-icteric	  Lymphatic: No cervical lymphadenopathy  Cardiovascular: Normal S1 S2, No JVD, No cardiac murmurs, No carotid bruits, No peripheral edema, +s3  Respiratory: Lungs clear to auscultation	  Psychiatry: A & O x 3, Mood & affect appropriate  Gastrointestinal:  Soft, Non-tender, + BS, no bruits	  Skin: No rashes, No ecchymoses, No cyanosis  Neurologic: Grossly non-focal with full strength in all four extremities  Extremities: Normal range of motion, No clubbing, cyanosis or edema  Vascular: Peripheral pulses palpable 1+ bilaterally    ECG: NSR, normal axis and intervals, AS MI new since prior EKG    LABS:                        15.1   11.12 )-----------( 212      ( 2020 17:51 )             46.9         141  |  106  |  21.0<H>  ----------------------------<  138<H>  4.0   |  20.0<L>  |  0.61    Ca    9.5      2020 17:51    TPro  6.8  /  Alb  4.1  /  TBili  0.5  /  DBili  x   /  AST  48<H>  /  ALT  21  /  AlkPhos  131<H>      CARDIAC MARKERS ( 2020 17:51 )  x     / 0.64 ng/mL / x     / x     / x      Urinalysis Basic - ( 2020 17:50 )  Color: Yellow / Appearance: Slightly Turbid / S.025 / pH: x  Gluc: x / Ketone: Large  / Bili: Negative / Urobili: Negative mg/dL   Blood: x / Protein: 100 mg/dL / Nitrite: Negative   Leuk Esterase: Moderate / RBC: 0-2 /HPF / WBC >50   Sq Epi: x / Non Sq Epi: Moderate / Bacteria: Moderate      RADIOLOGY & ADDITIONAL STUDIES:  CXr: negative for cardiopulmonary disease.

## 2020-02-26 LAB
ANION GAP SERPL CALC-SCNC: 14 MMOL/L — SIGNIFICANT CHANGE UP (ref 5–17)
APPEARANCE UR: CLEAR — SIGNIFICANT CHANGE UP
BACTERIA # UR AUTO: ABNORMAL
BASOPHILS # BLD AUTO: 0.01 K/UL — SIGNIFICANT CHANGE UP (ref 0–0.2)
BASOPHILS NFR BLD AUTO: 0.2 % — SIGNIFICANT CHANGE UP (ref 0–2)
BILIRUB UR-MCNC: NEGATIVE — SIGNIFICANT CHANGE UP
BUN SERPL-MCNC: 15 MG/DL — SIGNIFICANT CHANGE UP (ref 8–20)
CALCIUM SERPL-MCNC: 9 MG/DL — SIGNIFICANT CHANGE UP (ref 8.6–10.2)
CHLORIDE SERPL-SCNC: 107 MMOL/L — SIGNIFICANT CHANGE UP (ref 98–107)
CHOLEST SERPL-MCNC: 135 MG/DL — SIGNIFICANT CHANGE UP (ref 110–199)
CK MB CFR SERPL CALC: 34.9 NG/ML — HIGH (ref 0–6.7)
CK SERPL-CCNC: 419 U/L — HIGH (ref 25–170)
CO2 SERPL-SCNC: 22 MMOL/L — SIGNIFICANT CHANGE UP (ref 22–29)
COLOR SPEC: YELLOW — SIGNIFICANT CHANGE UP
CREAT SERPL-MCNC: 0.51 MG/DL — SIGNIFICANT CHANGE UP (ref 0.5–1.3)
CULTURE RESULTS: SIGNIFICANT CHANGE UP
DIFF PNL FLD: NEGATIVE — SIGNIFICANT CHANGE UP
EOSINOPHIL # BLD AUTO: 0.09 K/UL — SIGNIFICANT CHANGE UP (ref 0–0.5)
EOSINOPHIL NFR BLD AUTO: 1.4 % — SIGNIFICANT CHANGE UP (ref 0–6)
EPI CELLS # UR: SIGNIFICANT CHANGE UP
GLUCOSE SERPL-MCNC: 90 MG/DL — SIGNIFICANT CHANGE UP (ref 70–99)
GLUCOSE UR QL: NEGATIVE MG/DL — SIGNIFICANT CHANGE UP
HBA1C BLD-MCNC: 5.5 % — SIGNIFICANT CHANGE UP (ref 4–5.6)
HCT VFR BLD CALC: 41.3 % — SIGNIFICANT CHANGE UP (ref 34.5–45)
HDLC SERPL-MCNC: 57 MG/DL — SIGNIFICANT CHANGE UP
HGB BLD-MCNC: 13.4 G/DL — SIGNIFICANT CHANGE UP (ref 11.5–15.5)
IMM GRANULOCYTES NFR BLD AUTO: 0.2 % — SIGNIFICANT CHANGE UP (ref 0–1.5)
KETONES UR-MCNC: ABNORMAL
LEUKOCYTE ESTERASE UR-ACNC: ABNORMAL
LIPID PNL WITH DIRECT LDL SERPL: 64 MG/DL — SIGNIFICANT CHANGE UP
LYMPHOCYTES # BLD AUTO: 2.31 K/UL — SIGNIFICANT CHANGE UP (ref 1–3.3)
LYMPHOCYTES # BLD AUTO: 35.5 % — SIGNIFICANT CHANGE UP (ref 13–44)
MAGNESIUM SERPL-MCNC: 1.7 MG/DL — SIGNIFICANT CHANGE UP (ref 1.6–2.6)
MCHC RBC-ENTMCNC: 30.3 PG — SIGNIFICANT CHANGE UP (ref 27–34)
MCHC RBC-ENTMCNC: 32.4 GM/DL — SIGNIFICANT CHANGE UP (ref 32–36)
MCV RBC AUTO: 93.4 FL — SIGNIFICANT CHANGE UP (ref 80–100)
MONOCYTES # BLD AUTO: 0.56 K/UL — SIGNIFICANT CHANGE UP (ref 0–0.9)
MONOCYTES NFR BLD AUTO: 8.6 % — SIGNIFICANT CHANGE UP (ref 2–14)
NEUTROPHILS # BLD AUTO: 3.52 K/UL — SIGNIFICANT CHANGE UP (ref 1.8–7.4)
NEUTROPHILS NFR BLD AUTO: 54.1 % — SIGNIFICANT CHANGE UP (ref 43–77)
NITRITE UR-MCNC: NEGATIVE — SIGNIFICANT CHANGE UP
PH UR: 6 — SIGNIFICANT CHANGE UP (ref 5–8)
PLATELET # BLD AUTO: 181 K/UL — SIGNIFICANT CHANGE UP (ref 150–400)
POTASSIUM SERPL-MCNC: 3.8 MMOL/L — SIGNIFICANT CHANGE UP (ref 3.5–5.3)
POTASSIUM SERPL-SCNC: 3.8 MMOL/L — SIGNIFICANT CHANGE UP (ref 3.5–5.3)
PROT UR-MCNC: 15 MG/DL
RBC # BLD: 4.42 M/UL — SIGNIFICANT CHANGE UP (ref 3.8–5.2)
RBC # FLD: 13.5 % — SIGNIFICANT CHANGE UP (ref 10.3–14.5)
SODIUM SERPL-SCNC: 143 MMOL/L — SIGNIFICANT CHANGE UP (ref 135–145)
SP GR SPEC: 1.02 — SIGNIFICANT CHANGE UP (ref 1.01–1.02)
SPECIMEN SOURCE: SIGNIFICANT CHANGE UP
TOTAL CHOLESTEROL/HDL RATIO MEASUREMENT: 2 RATIO — LOW (ref 3.3–7.1)
TRIGL SERPL-MCNC: 72 MG/DL — SIGNIFICANT CHANGE UP (ref 10–200)
TROPONIN T SERPL-MCNC: 0.54 NG/ML — HIGH (ref 0–0.06)
TSH SERPL-MCNC: 2.6 UIU/ML — SIGNIFICANT CHANGE UP (ref 0.27–4.2)
UROBILINOGEN FLD QL: NEGATIVE MG/DL — SIGNIFICANT CHANGE UP
VIT B12 SERPL-MCNC: 920 PG/ML — SIGNIFICANT CHANGE UP (ref 232–1245)
WBC # BLD: 6.5 K/UL — SIGNIFICANT CHANGE UP (ref 3.8–10.5)
WBC # FLD AUTO: 6.5 K/UL — SIGNIFICANT CHANGE UP (ref 3.8–10.5)
WBC UR QL: SIGNIFICANT CHANGE UP /HPF (ref 0–5)

## 2020-02-26 PROCEDURE — 93458 L HRT ARTERY/VENTRICLE ANGIO: CPT | Mod: 26

## 2020-02-26 PROCEDURE — 99232 SBSQ HOSP IP/OBS MODERATE 35: CPT

## 2020-02-26 PROCEDURE — 99233 SBSQ HOSP IP/OBS HIGH 50: CPT

## 2020-02-26 PROCEDURE — 99152 MOD SED SAME PHYS/QHP 5/>YRS: CPT

## 2020-02-26 RX ORDER — ATORVASTATIN CALCIUM 80 MG/1
40 TABLET, FILM COATED ORAL AT BEDTIME
Refills: 0 | Status: DISCONTINUED | OUTPATIENT
Start: 2020-02-26 | End: 2020-02-28

## 2020-02-26 RX ORDER — CLOPIDOGREL BISULFATE 75 MG/1
75 TABLET, FILM COATED ORAL DAILY
Refills: 0 | Status: DISCONTINUED | OUTPATIENT
Start: 2020-02-26 | End: 2020-02-28

## 2020-02-26 RX ADMIN — CEFTRIAXONE 100 MILLIGRAM(S): 500 INJECTION, POWDER, FOR SOLUTION INTRAMUSCULAR; INTRAVENOUS at 17:07

## 2020-02-26 RX ADMIN — CLOPIDOGREL BISULFATE 75 MILLIGRAM(S): 75 TABLET, FILM COATED ORAL at 11:33

## 2020-02-26 RX ADMIN — DORZOLAMIDE HYDROCHLORIDE 1 DROP(S): 20 SOLUTION/ DROPS OPHTHALMIC at 05:44

## 2020-02-26 RX ADMIN — Medication 25 MICROGRAM(S): at 05:44

## 2020-02-26 RX ADMIN — ATORVASTATIN CALCIUM 40 MILLIGRAM(S): 80 TABLET, FILM COATED ORAL at 22:13

## 2020-02-26 RX ADMIN — DORZOLAMIDE HYDROCHLORIDE 1 DROP(S): 20 SOLUTION/ DROPS OPHTHALMIC at 22:13

## 2020-02-26 RX ADMIN — Medication 25 MILLIGRAM(S): at 08:05

## 2020-02-26 RX ADMIN — Medication 81 MILLIGRAM(S): at 11:33

## 2020-02-26 RX ADMIN — LISINOPRIL 2.5 MILLIGRAM(S): 2.5 TABLET ORAL at 08:05

## 2020-02-26 RX ADMIN — SERTRALINE 25 MILLIGRAM(S): 25 TABLET, FILM COATED ORAL at 11:33

## 2020-02-26 RX ADMIN — ENOXAPARIN SODIUM 60 MILLIGRAM(S): 100 INJECTION SUBCUTANEOUS at 07:29

## 2020-02-26 NOTE — PROGRESS NOTE ADULT - SUBJECTIVE AND OBJECTIVE BOX
Normal coronary vasculature.   Depressed LV function- EF 25-30%. Stress induced myopathy pattern.   NICM.

## 2020-02-26 NOTE — PROGRESS NOTE ADULT - SUBJECTIVE AND OBJECTIVE BOX
CHIEF COMPLAINT:Patient is a 88y old  Female who presents with a chief complaint of Mechanical fall/ NSTEMI (25 Feb 2020 23:36)      INTERVAL HISTORY:  pt is seen today, has no cp or sob.  no n/v.   for [facility] today.     Allergies  No Known Allergies  	  MEDICATIONS:  lisinopril 2.5 milliGRAM(s) Oral daily  metoprolol tartrate 25 milliGRAM(s) Oral two times a day  cefTRIAXone   IVPB 1000 milliGRAM(s) IV Intermittent every 24 hours  acetaminophen   Tablet .. 650 milliGRAM(s) Oral every 6 hours PRN  melatonin 5 milliGRAM(s) Oral at bedtime  ondansetron Injectable 4 milliGRAM(s) IV Push every 6 hours PRN  sertraline 25 milliGRAM(s) Oral daily  atorvastatin 10 milliGRAM(s) Oral at bedtime  levothyroxine 25 MICROGram(s) Oral daily  aspirin enteric coated 81 milliGRAM(s) Oral daily  clopidogrel Tablet 75 milliGRAM(s) Oral daily  dorzolamide 2% Ophthalmic Solution 1 Drop(s) Both EYES every 8 hours  sodium chloride 0.9%. 1000 milliLiter(s) IV Continuous <Continuous>    PHYSICAL EXAM:  T(C): 36.4 (02-26-20 @ 05:28), Max: 36.5 (02-25-20 @ 19:37)  HR: 64 (02-26-20 @ 05:28) (62 - 79)  BP: 105/69 (02-26-20 @ 05:28) (105/69 - 130/80)  RR: 18 (02-26-20 @ 05:28) (18 - 20)  SpO2: 99% (02-26-20 @ 05:28) (97% - 99%)  I&O's Summary    25 Feb 2020 07:01  -  26 Feb 2020 07:00  --------------------------------------------------------  IN: 455 mL / OUT: 0 mL / NET: 455 mL  Appearance: Normal	  HEENT:   NC/AT  Eye: Pink Conjunctiva  Lungs: CTA B/L  CVS: RRR, Normal S1 and S2, No Edema  Neuro: A&O x3    LABS:	 	                         13.4   6.50  )-----------( 181      ( 26 Feb 2020 05:46 )             41.3     02-26    143  |  107  |  15.0  ----------------------------<  90  3.8   |  22.0  |  0.51    Ca    9.0      26 Feb 2020 05:46  Mg     1.7     02-26    TPro  6.8  /  Alb  4.1  /  TBili  0.5  /  DBili  x   /  AST  48<H>  /  ALT  21  /  AlkPhos  131<H>  02-25    proBNP:   Lipid Profile:   HgA1c: Hemoglobin A1C, Whole Blood: 5.5 % (02-26 @ 05:46)  TSH: Thyroid Stimulating Hormone, Serum: 2.60 uIU/mL (02-26 @ 05:46)    ASSESSMENT/PLAN: 	  NSTEMI, got plavix 300 mg last night. on plavix 75 mg daily now  CE flat.  Plan for cath today.  Spoke with pt and daughter Alma, agreeable to proceed.  Cont with current meds, increase lipitor to 40 mg daily.

## 2020-02-26 NOTE — PROGRESS NOTE ADULT - ASSESSMENT
87 y/o female with PMH of hypothyroidism, depression, came to the ED s/p fall. Patient said she was going to the the living room after making sandwich for lunch in the kitchen when all of a sudden her legs gave up on her and she fell to the floor, not hitting her head; she was unable to get up so she laid on the floor until her daughter came back home. She was on the floor for about 2 hours. Patient was able to recall the events. On getting to the ED, she reported feeling nauseous and vomited once. Of note, she was seen in the ED a day prior for decrease PO intake and UT; discharged home on Sertraline and antibiotic which she said she has started using. Found to have elevated troponin in the ED.    #NSTEMI with acute systolic congestive heart failure - compensated at this time  - Admit to telemetry  - Plavix 300mg and Aspirin 325mg once given   - Lovenox 60mg once also given by cardiology   - Continue Aspirin 81mg  - she is for cath today  - BB and statin   - TTE done - EF 20%  - Trend CE    #Mechanical fall  - CT head: unremarkable  - PT evaluation - recommending home  - SW consult placed as family leaning towards assisted living for patient    #UTI   - Ceftriaxone     #Hypothyroidism   - Continue Synthroid 25mcg     #Depression   - Sertraline 25mg     #DVT prophylaxis: Lovenox

## 2020-02-26 NOTE — PROGRESS NOTE ADULT - SUBJECTIVE AND OBJECTIVE BOX
Cardiology Nurse Practitioner Note  Pre procedure    88 year old female who p/w fall and found to be a NSTEMI with EF <20%.  For [facility] to assess coronary arteries    VSS  Neuro: A&O X3  Lungs: CTA  CV: RRR  Ext: + palp pedal pulse    ASA 2  [place] 2  GFR 86  Bleeding risk 1.9%    -took ASA/plavix  -cleared for procedure  -consent obtained

## 2020-02-26 NOTE — PROGRESS NOTE ADULT - SUBJECTIVE AND OBJECTIVE BOX
CC: Mechanical fall/ NSTEMI (2020 07:58)    INTERVAL HPI/OVERNIGHT EVENTS:  no acute events  denies chest pain  no complaints    Vital Signs Last 24 Hrs  T(C): 36.8 (2020 08:00), Max: 36.8 (2020 08:00)  T(F): 98.3 (2020 08:00), Max: 98.3 (2020 08:00)  HR: 61 (2020 08:00) (61 - 79)  BP: 109/70 (2020 08:00) (105/69 - 130/80)  BP(mean): --  RR: 16 (2020 08:00) (16 - 20)  SpO2: 97% (2020 08:00) (97% - 99%)    PHYSICAL EXAM:  General: in no acute distress; elderly female  Neck: Supple; non tender; no masses  Respiratory: No wheezes, rales or rhonchi  Cardiovascular: Regular rate and rhythm. S1 and S2 Normal; No murmurs, gallops or rubs  Gastrointestinal: Soft non-tender non-distended; Normal bowel sounds  Extremities: Normal range of motion, No clubbing, cyanosis or edema  Vascular: Peripheral pulses palpable 2+ bilaterally  Neurological: Alert and oriented x4  Skin: Warm and dry. No acute rash  Psychiatric: Cooperative and appropriate    I&O's Detail    2020 07:01  -  2020 07:00  --------------------------------------------------------  IN:    sodium chloride 0.9%.: 455 mL  Total IN: 455 mL    OUT:  Total OUT: 0 mL    Total NET: 455 mL    CARDIAC MARKERS ( 2020 05:46 )  x     / 0.54 ng/mL / 419 U/L / x     / 34.9 ng/mL  CARDIAC MARKERS ( 2020 21:29 )  x     / 0.62 ng/mL / x     / x     / x      CARDIAC MARKERS ( 2020 17:51 )  x     / 0.64 ng/mL / x     / x     / x                          13.4   6.50  )-----------( 181      ( 2020 05:46 )             41.3     2020 05:46    143    |  107    |  15.0   ----------------------------<  90     3.8     |  22.0   |  0.51     Ca    9.0        2020 05:46  Mg     1.7       2020 05:46    TPro  6.8    /  Alb  4.1    /  TBili  0.5    /  DBili  x      /  AST  48     /  ALT  21     /  AlkPhos  131    2020 17:51    CAPILLARY BLOOD GLUCOSE  POCT Blood Glucose.: 125 mg/dL (2020 16:32)    LIVER FUNCTIONS - ( 2020 17:51 )  Alb: 4.1 g/dL / Pro: 6.8 g/dL / ALK PHOS: 131 U/L / ALT: 21 U/L / AST: 48 U/L / GGT: x           Urinalysis Basic - ( 2020 17:50 )    Color: Yellow / Appearance: Slightly Turbid / S.025 / pH: x  Gluc: x / Ketone: Large  / Bili: Negative / Urobili: Negative mg/dL   Blood: x / Protein: 100 mg/dL / Nitrite: Negative   Leuk Esterase: Moderate / RBC: 0-2 /HPF / WBC >50   Sq Epi: x / Non Sq Epi: Moderate / Bacteria: Moderate    Hemoglobin A1C, Whole Blood: 5.5 % (20 @ 05:46)    MEDICATIONS  (STANDING):  aspirin enteric coated 81 milliGRAM(s) Oral daily  atorvastatin 40 milliGRAM(s) Oral at bedtime  cefTRIAXone   IVPB 1000 milliGRAM(s) IV Intermittent every 24 hours  clopidogrel Tablet 75 milliGRAM(s) Oral daily  dorzolamide 2% Ophthalmic Solution 1 Drop(s) Both EYES every 8 hours  levothyroxine 25 MICROGram(s) Oral daily  lisinopril 2.5 milliGRAM(s) Oral daily  melatonin 5 milliGRAM(s) Oral at bedtime  metoprolol tartrate 25 milliGRAM(s) Oral two times a day  sertraline 25 milliGRAM(s) Oral daily  sodium chloride 0.9%. 1000 milliLiter(s) (65 mL/Hr) IV Continuous <Continuous>    MEDICATIONS  (PRN):  acetaminophen   Tablet .. 650 milliGRAM(s) Oral every 6 hours PRN Temp greater or equal to 38C (100.4F), Mild Pain (1 - 3)  ondansetron Injectable 4 milliGRAM(s) IV Push every 6 hours PRN Nausea    RADIOLOGY & ADDITIONAL TESTS:

## 2020-02-26 NOTE — PROGRESS NOTE ADULT - SUBJECTIVE AND OBJECTIVE BOX
Nurse Practitioner Progress note:     INTERVAL HISTORY: 88 year old female presents after fall with + trops    MEDICATIONS:  lisinopril 2.5 milliGRAM(s) Oral daily  metoprolol tartrate 25 milliGRAM(s) Oral two times a day    cefTRIAXone   IVPB 1000 milliGRAM(s) IV Intermittent every 24 hours      acetaminophen   Tablet .. 650 milliGRAM(s) Oral every 6 hours PRN  melatonin 5 milliGRAM(s) Oral at bedtime  ondansetron Injectable 4 milliGRAM(s) IV Push every 6 hours PRN  sertraline 25 milliGRAM(s) Oral daily      atorvastatin 40 milliGRAM(s) Oral at bedtime  levothyroxine 25 MICROGram(s) Oral daily    aspirin enteric coated 81 milliGRAM(s) Oral daily  clopidogrel Tablet 75 milliGRAM(s) Oral daily  dorzolamide 2% Ophthalmic Solution 1 Drop(s) Both EYES every 8 hours  sodium chloride 0.9%. 1000 milliLiter(s) IV Continuous <Continuous>      TELEMETRY: SB 54 bpm    T(C): 36.5 (02-26-20 @ 17:30), Max: 36.8 (02-26-20 @ 08:00)  HR: 61 (02-26-20 @ 19:00) (57 - 79)  BP: 111/56 (02-26-20 @ 19:00) (99/63 - 130/80)  RR: 17 (02-26-20 @ 19:00) (16 - 18)  SpO2: 94% (02-26-20 @ 19:00) (93% - 99%)  Wt(kg): --    PHYSICAL EXAM:  Appearance: Normal	  Cardiovascular: Normal S1 S2, No JVD, No murmurs, No edema  Respiratory: Lungs clear to auscultation	  Psychiatry: A & O x 3, Mood & affect appropriate  Neurologic: Non-focal, A&O X3.  No neuro deficits  Procedure Site: Right radial band in place.  Site benign.  No bleeding/hematoma/ecchymosis. + palp radial pulse      PROCEDURE RESULTS: S/p lHc which revealed Takostsubo syndrom.    ASSESSMENT/PLAN: 	  -Radial precautions  -D/C radial band 1-2 hours  -Resume home meds, reviewed with Dr. Campbell  -Site check in AM  -F/U Dr. Soni  -Family requesting Social work consult for living situtation

## 2020-02-27 ENCOUNTER — TRANSCRIPTION ENCOUNTER (OUTPATIENT)
Age: 85
End: 2020-02-27

## 2020-02-27 DIAGNOSIS — Z98.890 OTHER SPECIFIED POSTPROCEDURAL STATES: ICD-10-CM

## 2020-02-27 DIAGNOSIS — I51.81 TAKOTSUBO SYNDROME: ICD-10-CM

## 2020-02-27 PROCEDURE — 99232 SBSQ HOSP IP/OBS MODERATE 35: CPT

## 2020-02-27 RX ORDER — CEFTRIAXONE 500 MG/1
1000 INJECTION, POWDER, FOR SOLUTION INTRAMUSCULAR; INTRAVENOUS EVERY 24 HOURS
Refills: 0 | Status: COMPLETED | OUTPATIENT
Start: 2020-02-27 | End: 2020-02-27

## 2020-02-27 RX ORDER — ATORVASTATIN CALCIUM 80 MG/1
1 TABLET, FILM COATED ORAL
Qty: 30 | Refills: 0
Start: 2020-02-27 | End: 2020-03-27

## 2020-02-27 RX ORDER — LOSARTAN POTASSIUM 100 MG/1
25 TABLET, FILM COATED ORAL DAILY
Refills: 0 | Status: DISCONTINUED | OUTPATIENT
Start: 2020-02-27 | End: 2020-02-28

## 2020-02-27 RX ORDER — METOPROLOL TARTRATE 50 MG
1 TABLET ORAL
Qty: 30 | Refills: 0
Start: 2020-02-27 | End: 2020-03-27

## 2020-02-27 RX ORDER — ATORVASTATIN CALCIUM 80 MG/1
1 TABLET, FILM COATED ORAL
Qty: 0 | Refills: 0 | DISCHARGE

## 2020-02-27 RX ORDER — METOPROLOL TARTRATE 50 MG
25 TABLET ORAL DAILY
Refills: 0 | Status: DISCONTINUED | OUTPATIENT
Start: 2020-02-27 | End: 2020-02-28

## 2020-02-27 RX ORDER — SERTRALINE 25 MG/1
1 TABLET, FILM COATED ORAL
Qty: 30 | Refills: 0
Start: 2020-02-27 | End: 2020-03-27

## 2020-02-27 RX ORDER — CLOPIDOGREL BISULFATE 75 MG/1
1 TABLET, FILM COATED ORAL
Qty: 30 | Refills: 0
Start: 2020-02-27 | End: 2020-03-27

## 2020-02-27 RX ORDER — LOSARTAN POTASSIUM 100 MG/1
1 TABLET, FILM COATED ORAL
Qty: 30 | Refills: 0
Start: 2020-02-27 | End: 2020-03-27

## 2020-02-27 RX ADMIN — DORZOLAMIDE HYDROCHLORIDE 1 DROP(S): 20 SOLUTION/ DROPS OPHTHALMIC at 22:04

## 2020-02-27 RX ADMIN — Medication 25 MICROGRAM(S): at 05:07

## 2020-02-27 RX ADMIN — Medication 25 MILLIGRAM(S): at 05:06

## 2020-02-27 RX ADMIN — Medication 5 MILLIGRAM(S): at 22:03

## 2020-02-27 RX ADMIN — ATORVASTATIN CALCIUM 40 MILLIGRAM(S): 80 TABLET, FILM COATED ORAL at 22:03

## 2020-02-27 RX ADMIN — CLOPIDOGREL BISULFATE 75 MILLIGRAM(S): 75 TABLET, FILM COATED ORAL at 10:08

## 2020-02-27 RX ADMIN — CEFTRIAXONE 100 MILLIGRAM(S): 500 INJECTION, POWDER, FOR SOLUTION INTRAMUSCULAR; INTRAVENOUS at 12:08

## 2020-02-27 RX ADMIN — SERTRALINE 25 MILLIGRAM(S): 25 TABLET, FILM COATED ORAL at 10:08

## 2020-02-27 RX ADMIN — DORZOLAMIDE HYDROCHLORIDE 1 DROP(S): 20 SOLUTION/ DROPS OPHTHALMIC at 05:06

## 2020-02-27 RX ADMIN — Medication 81 MILLIGRAM(S): at 10:08

## 2020-02-27 NOTE — PROGRESS NOTE ADULT - REASON FOR ADMISSION
Mechanical fall/ NSTEMI

## 2020-02-27 NOTE — PROGRESS NOTE ADULT - ATTENDING COMMENTS
pt seen and examined.  plan of care was dw np.  s/p cath. stress induced cm  HF meds adjusted.  I agree with a.p.

## 2020-02-27 NOTE — DISCHARGE NOTE PROVIDER - HOSPITAL COURSE
89 y/o female with PMH of hypothyroidism, depression, came to the ED s/p fall. Patient said she was going to the the living room after making sandwich for lunch in the kitchen when all of a sudden her legs gave up on her and she fell to the floor, not hitting her head; she was unable to get up so she laid on the floor until her daughter came back home. She was on the floor for about 2 hours. Patient was able to recall the events. On getting to the ED, she reported feeling nauseous and vomited once. Of note, she was seen in the ED a day prior for decrease PO intake and UT; discharged home on Sertraline and antibiotic which she said she has started using. Found to have elevated troponin in the ED. Her issues are as follows:        #NSTEMI with acute systolic congestive heart failure - compensated at this time without symptoms of fluid overload    - underwent cath which showed non-obstructive disease    - will continue on aspirin 81mg daily    - c/w with BB and statin     - losartan added    - will follow up with cardiology on discharge (Dr. Soni)        #Mechanical fall    - CT head: unremarkable    - PT evaluation - recommending discharge to home        #UTI     - completed 3 days for uncomplicated cystitis        #Hypothyroidism     - Continue Synthroid 25mcg         #Depression     - Sertraline 25mg         Vital Signs Last 24 Hrs    T(C): 36.7 (27 Feb 2020 05:02), Max: 36.8 (26 Feb 2020 16:34)    T(F): 98 (27 Feb 2020 05:02), Max: 98.2 (26 Feb 2020 16:34)    HR: 69 (27 Feb 2020 05:02) (57 - 84)    BP: 103/64 (27 Feb 2020 05:09) (99/61 - 125/72)    BP(mean): --    RR: 18 (27 Feb 2020 05:02) (16 - 20)    SpO2: 94% (27 Feb 2020 05:02) (93% - 97%)        PHYSICAL EXAM:    General: in no acute distress; elderly female    Neck: Supple; non tender; no masses    Respiratory: No wheezes, rales or rhonchi    Cardiovascular: Regular rate and rhythm. S1 and S2 Normal; No murmurs, gallops or rubs    Gastrointestinal: Soft non-tender non-distended; Normal bowel sounds    Extremities: Normal range of motion, No clubbing, cyanosis or edema    Vascular: Peripheral pulses palpable 2+ bilaterally    Neurological: Alert and oriented x4    Skin: Warm and dry. No acute rash    Psychiatric: Cooperative and appropriate        discharge time 34 minutes 89 y/o female with PMH of hypothyroidism, depression, came to the ED s/p fall. Patient said she was going to the the living room after making sandwich for lunch in the kitchen when all of a sudden her legs gave up on her and she fell to the floor, not hitting her head; she was unable to get up so she laid on the floor until her daughter came back home. She was on the floor for about 2 hours. Patient was able to recall the events. On getting to the ED, she reported feeling nauseous and vomited once. Of note, she was seen in the ED a day prior for decrease PO intake and UT; discharged home on Sertraline and antibiotic which she said she has started using. Found to have elevated troponin in the ED. Her issues are as follows:        #NSTEMI with acute systolic congestive heart failure - compensated at this time without symptoms of fluid overload    - underwent cath which showed non-obstructive disease    - will continue on aspirin 81mg daily    - c/w with BB and statin     - losartan added    - will follow up with cardiology on discharge (Dr. Soni)        #Mechanical fall    - CT head: unremarkable    - PT evaluation - recommending discharge to home    - family looking to place in assisted living facility on discharge        #UTI     - completed 3 days for uncomplicated cystitis        #Hypothyroidism     - Continue Synthroid 25mcg         #Depression     - Sertraline 25mg         Vital Signs Last 24 Hrs    T(C): 36.4 (28 Feb 2020 05:39), Max: 36.6 (27 Feb 2020 10:17)    T(F): 97.5 (28 Feb 2020 05:39), Max: 97.9 (27 Feb 2020 10:17)    HR: 67 (28 Feb 2020 05:39) (67 - 73)    BP: 118/75 (28 Feb 2020 05:39) (100/60 - 130/87)    BP(mean): --    RR: 16 (28 Feb 2020 05:39) (16 - 18)    SpO2: 90% (28 Feb 2020 05:39) (90% - 95%)        PHYSICAL EXAM:    General: in no acute distress; elderly female    Neck: Supple; non tender; no masses    Respiratory: No wheezes, rales or rhonchi    Cardiovascular: Regular rate and rhythm. S1 and S2 Normal; No murmurs, gallops or rubs    Gastrointestinal: Soft non-tender non-distended; Normal bowel sounds    Extremities: Normal range of motion, No clubbing, cyanosis or edema    Vascular: Peripheral pulses palpable 2+ bilaterally    Neurological: Alert and oriented x4    Skin: Warm and dry. No acute rash    Psychiatric: Cooperative and appropriate        discharge time 43 minutes

## 2020-02-27 NOTE — DISCHARGE NOTE PROVIDER - NSDCMRMEDTOKEN_GEN_ALL_CORE_FT
Aspir 81 oral delayed release tablet: 1 tab(s) orally once a day   atorvastatin 40 mg oral tablet: 1 tab(s) orally once a day (at bedtime)  clopidogrel 75 mg oral tablet: 1 tab(s) orally once a day  dorzolamide 2% ophthalmic solution: 1 drop(s) to each affected eye 2 times a day  levothyroxine 25 mcg (0.025 mg) oral tablet: 1 tab(s) orally once a day  losartan 25 mg oral tablet: 1 tab(s) orally once a day  metoprolol succinate 25 mg oral tablet, extended release: 1 tab(s) orally once a day  rolling walker:   sertraline 25 mg oral tablet: 1 tab(s) orally once a day

## 2020-02-27 NOTE — PROGRESS NOTE ADULT - SUBJECTIVE AND OBJECTIVE BOX
[facility]                          [address] [place] [address]                       Phone: [phone]. Fax:[phone]                      ________________________________________________           Reason for follow up/Overnight events: s/p LHC - takotsubo CM    HPI:  87 y/o female with PMH of hypothyroidism, depression, came to the ED s/p fall. Patient said she was going to the the living room after making sandwich for lunch in the kitchen when all of a sudden her legs gave up on her and she fell to the floor, not hitting her head; she was unable to get up so she laid on the floor until her daughter came back home. She was on the floor for about 2 hours. Patient was able to recall the events. On getting to the ED, she reported feeling nauseous and vomited once. Of note, she was seen in the ED a day prior for decrease PO intake and UT; discharged home on Sertraline and antibiotic which she said she has started using. She has no fever, chills, tongue bite, bowel/bladder incontinence, HA, weakness, paresthesia, chest pain, palpitation, shortness of breath. (2020 23:36)    ROS: All review of systems negative unless indicated otherwise below.                          LAB RESULTS                   COMPLETE BLOOD COUNT( 2020 05:46 )                            13.4 g/dL  6.50 K/uL )---------------( 181 K/uL                        41.3 %      Automated Differential     Auto Basophil # - 0.01 K/uL  Auto Basophil % - 0.2 %  Auto Eosinophil # - 0.09 K/uL  Auto Eosinophil % - 1.4 %  Auto Immature Granulocyte # - X      Auto Immature Granulocyte % - 0.2 %  Auto Lymphocyte # - 2.31 K/uL  Auto Lymphocyte % - 35.5 %  Auto Monocyte # - 0.56 K/uL  Auto Monocyte % - 8.6 %  Auto Neutrophil # - 3.52 K/uL  Auto Neutrophil % - 54.1 %                                  CHEMISTRY                 Basic Metabolic Panel (20 @ 05:46)    143  |  107  |  15.0  ----------------------------<  90  3.8   |  22.0  |  0.51    Ca    9.0      2020 05:46  Mg     1.7                         Liver Functions (20 @ 17:51))  TPro  6.8  /  Alb  4.1  /  TBili  0.5  /  DBili  x   /  AST  48  /  ALT  21  /  AlkPhos  131                               Cardiac Enzymes   ( 2020 05:46 )  Troponin T  0.54<H>,  CPK  419<H>, CKMB  X    , BNP X        , ( 2020 21:29 )  Troponin T  0.62<H>,  CPK  X    , CKMB  X    , BNP X        , ( 2020 17:51 )  Troponin T  0.64<H>,  CPK  X    , CKMB  X    , BNP X                              MICROBIOLOGY/CULTURES:  Culture - Urine (collected 20 @ 19:02)  Source: .Urine  Final Report (20 @ 17:01):    >=3 organisms. Probable collection contamination.                          RADIOLOGY RESULTS: Personally visualized   < from: Xray Chest 1 View AP/PA. (20 @ 18:23) >  Impression:    Clear lungs.    < end of copied text >                          CARDIOLOGY RESULTS: Official Report/Preliminary Verbal Reports    ECHO:   < from: TTE Echo Complete w/Doppler (20 @ 19:26) >  Summary:   1. Left ventricular ejection fraction, by visual estimation, is <20%.   2. Severely decreased global left ventricular systolic function.   3. Multiple left ventricular regional wall motion abnormalities exist. See wall motion findings.   4. There is mild septal left ventricular hypertrophy.   5. Mild mitral annular calcification.   6. Thickening of the anterior and posterior mitral valve leaflets.    MD Barry Electronically signed on 2020 at 10:27:39 AM    < end of copied text >    CATH: takotsubo cardiomyopathy                          CARDIOLOGY REVIEW: Personally visualized and reviewed  Telemetry Last 24h: SB, SR, PVC                               DAILY WEIGHTS - 48 HOUR TREND     Daily Weight in k.1 (2020 06:20)                             INTAKE AND OUTPUT - 48 HOUR TREND     20 @ 07:01  -  20 @ 07:00  --------------------------------------------------------  IN:  Total IN: 0 mL    OUT:    Voided: 400 mL  Total OUT: 400 mL    Total NET: -400 mL    HOME MEDICATIONS:  atorvastatin 10 mg oral tablet: 1 tab(s) orally once a day (2020 20:00)  dorzolamide 2% ophthalmic solution: 1 drop(s) to each affected eye 2 times a day (2020 20:00)  levothyroxine 25 mcg (0.025 mg) oral tablet: 1 tab(s) orally once a day (2020 20:00)                             Current Admission Active Medications    acetaminophen   Tablet .. 650 milliGRAM(s) Oral every 6 hours PRN Temp greater or equal to 38C (100.4F), Mild Pain (1 - 3)  aspirin enteric coated 81 milliGRAM(s) Oral daily  atorvastatin 40 milliGRAM(s) Oral at bedtime  cefTRIAXone   IVPB 1000 milliGRAM(s) IV Intermittent every 24 hours  clopidogrel Tablet 75 milliGRAM(s) Oral daily  dorzolamide 2% Ophthalmic Solution 1 Drop(s) Both EYES every 8 hours  levothyroxine 25 MICROGram(s) Oral daily  lisinopril 2.5 milliGRAM(s) Oral daily  melatonin 5 milliGRAM(s) Oral at bedtime  metoprolol tartrate 25 milliGRAM(s) Oral two times a day  ondansetron Injectable 4 milliGRAM(s) IV Push every 6 hours PRN Nausea  sertraline 25 milliGRAM(s) Oral daily  sodium chloride 0.9%. 1000 milliLiter(s) (65 mL/Hr) IV Continuous <Continuous>                        PHYSICAL EXAM:    Vital Signs Last 24 Hrs  T(C): 36.7 (2020 05:02), Max: 36.8 (2020 16:34)  T(F): 98 (2020 05:02), Max: 98.2 (2020 16:34)  HR: 69 (2020 05:02) (57 - 84)  BP: 103/64 (2020 05:09) (99/61 - 125/72)  BP(mean): --  RR: 18 (2020 05:02) (16 - 20)  SpO2: 94% (2020 05:02) (93% - 97%)    GENERAL: NAD  NECK: Supple, No JVD  NERVOUS SYSTEM:  Alert & Oriented X3, non focal neuro exam.   CHEST/LUNG: clear lungs, No rales, rhonchi, wheezing, or rubs  HEART: Regular rate and rhythm; s1 and s2 auscultated, No murmurs, rubs, or gallops  ABDOMEN: Soft, Nontender, Nondistended; Bowel sounds present and normoactive.   EXTREMITIES:  2+ Peripheral Pulses, No clubbing, cyanosis, or edema  CATH SITE: Right wrist benign s/p cath.  No bleeding, no ecchymosis, no hematoma. Extremity Warm to touch, with palpable distal pulses, and brisk capillary refill.

## 2020-02-27 NOTE — DISCHARGE NOTE PROVIDER - CARE PROVIDER_API CALL
Jair Soni)  Cardiovascular Disease  [address], [place] [address]  Phone: [phone]  Fax: [phone]  Follow Up Time: 1 week

## 2020-02-27 NOTE — PROGRESS NOTE ADULT - ASSESSMENT
89 y/o female with PMH of hypothyroidism, depression, came to the ED s/p fall. Patient said she was going to the the living room after making sandwich for lunch in the kitchen when all of a sudden her legs gave up on her and she fell to the floor, not hitting her head; she was unable to get up so she laid on the floor until her daughter came back home. She was on the floor for about 2 hours. Patient was able to recall the events. On getting to the ED, she reported feeling nauseous and vomited once. Of note, she was seen in the ED a day prior for decrease PO intake and UT; discharged home on Sertraline and antibiotic which she said she has started using. Now s/p LHC which showed takotsubo cardiomyopathy.

## 2020-02-27 NOTE — PROGRESS NOTE ADULT - PROBLEM SELECTOR PLAN 1
s/p LHC - TCM  Continue ASA, Plavix, Lipitor,   start toprol 25mg daily  start losartan 25mg daily   Pt with stable vital signs, telemetry stable, physical exam unremarkable and unchanged from pre-procedural baseline, neurovascularly intact, ambulating, eating, review of systems completely negative. pt verbalized an understanding of the discharge teaching. Patient to follow up with Dr. Soni outpatient.

## 2020-02-27 NOTE — DISCHARGE NOTE PROVIDER - NSDCCPCAREPLAN_GEN_ALL_CORE_FT
PRINCIPAL DISCHARGE DIAGNOSIS  Diagnosis: Non-ST elevation MI (NSTEMI)  Assessment and Plan of Treatment: continue atorvastatin - dose increased to 40mg daily from 10mg daily  continue plavix (new medication)  continue aspirin  continue metoprolol 25mg BID  follow up with Dr. Soni  if chest pain return to the ED      SECONDARY DISCHARGE DIAGNOSES  Diagnosis: Depression  Assessment and Plan of Treatment: continue sertraline 25mg daily    Diagnosis: HTN (hypertension)  Assessment and Plan of Treatment: continue losartan (new medication)    Diagnosis: Hypothyroidism, unspecified type  Assessment and Plan of Treatment: continue levothyroxine

## 2020-02-28 ENCOUNTER — TRANSCRIPTION ENCOUNTER (OUTPATIENT)
Age: 85
End: 2020-02-28

## 2020-02-28 VITALS
DIASTOLIC BLOOD PRESSURE: 52 MMHG | SYSTOLIC BLOOD PRESSURE: 107 MMHG | HEART RATE: 67 BPM | TEMPERATURE: 98 F | OXYGEN SATURATION: 95 % | RESPIRATION RATE: 18 BRPM

## 2020-02-28 LAB
ANION GAP SERPL CALC-SCNC: 12 MMOL/L — SIGNIFICANT CHANGE UP (ref 5–17)
BUN SERPL-MCNC: 19 MG/DL — SIGNIFICANT CHANGE UP (ref 8–20)
CALCIUM SERPL-MCNC: 9.3 MG/DL — SIGNIFICANT CHANGE UP (ref 8.6–10.2)
CHLORIDE SERPL-SCNC: 108 MMOL/L — HIGH (ref 98–107)
CO2 SERPL-SCNC: 24 MMOL/L — SIGNIFICANT CHANGE UP (ref 22–29)
CREAT SERPL-MCNC: 0.51 MG/DL — SIGNIFICANT CHANGE UP (ref 0.5–1.3)
GLUCOSE SERPL-MCNC: 114 MG/DL — HIGH (ref 70–99)
MAGNESIUM SERPL-MCNC: 1.8 MG/DL — SIGNIFICANT CHANGE UP (ref 1.6–2.6)
POTASSIUM SERPL-MCNC: 3.8 MMOL/L — SIGNIFICANT CHANGE UP (ref 3.5–5.3)
POTASSIUM SERPL-SCNC: 3.8 MMOL/L — SIGNIFICANT CHANGE UP (ref 3.5–5.3)
SODIUM SERPL-SCNC: 144 MMOL/L — SIGNIFICANT CHANGE UP (ref 135–145)

## 2020-02-28 PROCEDURE — 70450 CT HEAD/BRAIN W/O DYE: CPT

## 2020-02-28 PROCEDURE — 85027 COMPLETE CBC AUTOMATED: CPT

## 2020-02-28 PROCEDURE — 71045 X-RAY EXAM CHEST 1 VIEW: CPT

## 2020-02-28 PROCEDURE — 82550 ASSAY OF CK (CPK): CPT

## 2020-02-28 PROCEDURE — 84443 ASSAY THYROID STIM HORMONE: CPT

## 2020-02-28 PROCEDURE — 86900 BLOOD TYPING SEROLOGIC ABO: CPT

## 2020-02-28 PROCEDURE — 80048 BASIC METABOLIC PNL TOTAL CA: CPT

## 2020-02-28 PROCEDURE — 86850 RBC ANTIBODY SCREEN: CPT

## 2020-02-28 PROCEDURE — 99152 MOD SED SAME PHYS/QHP 5/>YRS: CPT

## 2020-02-28 PROCEDURE — 36415 COLL VENOUS BLD VENIPUNCTURE: CPT

## 2020-02-28 PROCEDURE — 99285 EMERGENCY DEPT VISIT HI MDM: CPT | Mod: 25

## 2020-02-28 PROCEDURE — 82553 CREATINE MB FRACTION: CPT

## 2020-02-28 PROCEDURE — 80053 COMPREHEN METABOLIC PANEL: CPT

## 2020-02-28 PROCEDURE — 99239 HOSP IP/OBS DSCHRG MGMT >30: CPT

## 2020-02-28 PROCEDURE — 87086 URINE CULTURE/COLONY COUNT: CPT

## 2020-02-28 PROCEDURE — 80061 LIPID PANEL: CPT

## 2020-02-28 PROCEDURE — 82607 VITAMIN B-12: CPT

## 2020-02-28 PROCEDURE — 83735 ASSAY OF MAGNESIUM: CPT

## 2020-02-28 PROCEDURE — 86901 BLOOD TYPING SEROLOGIC RH(D): CPT

## 2020-02-28 PROCEDURE — C1887: CPT

## 2020-02-28 PROCEDURE — 93005 ELECTROCARDIOGRAM TRACING: CPT

## 2020-02-28 PROCEDURE — 99284 EMERGENCY DEPT VISIT MOD MDM: CPT | Mod: 25

## 2020-02-28 PROCEDURE — 84484 ASSAY OF TROPONIN QUANT: CPT

## 2020-02-28 PROCEDURE — 80307 DRUG TEST PRSMV CHEM ANLYZR: CPT

## 2020-02-28 PROCEDURE — 83036 HEMOGLOBIN GLYCOSYLATED A1C: CPT

## 2020-02-28 PROCEDURE — 82962 GLUCOSE BLOOD TEST: CPT

## 2020-02-28 PROCEDURE — 93306 TTE W/DOPPLER COMPLETE: CPT

## 2020-02-28 PROCEDURE — 97163 PT EVAL HIGH COMPLEX 45 MIN: CPT

## 2020-02-28 PROCEDURE — C1894: CPT

## 2020-02-28 PROCEDURE — 93458 L HRT ARTERY/VENTRICLE ANGIO: CPT

## 2020-02-28 PROCEDURE — 81001 URINALYSIS AUTO W/SCOPE: CPT

## 2020-02-28 PROCEDURE — 83690 ASSAY OF LIPASE: CPT

## 2020-02-28 PROCEDURE — 96374 THER/PROPH/DIAG INJ IV PUSH: CPT

## 2020-02-28 PROCEDURE — C1769: CPT

## 2020-02-28 PROCEDURE — 96375 TX/PRO/DX INJ NEW DRUG ADDON: CPT

## 2020-02-28 RX ADMIN — CLOPIDOGREL BISULFATE 75 MILLIGRAM(S): 75 TABLET, FILM COATED ORAL at 09:14

## 2020-02-28 RX ADMIN — LOSARTAN POTASSIUM 25 MILLIGRAM(S): 100 TABLET, FILM COATED ORAL at 05:42

## 2020-02-28 RX ADMIN — Medication 81 MILLIGRAM(S): at 09:14

## 2020-02-28 RX ADMIN — Medication 25 MICROGRAM(S): at 05:42

## 2020-02-28 RX ADMIN — Medication 25 MILLIGRAM(S): at 05:42

## 2020-02-28 RX ADMIN — SERTRALINE 25 MILLIGRAM(S): 25 TABLET, FILM COATED ORAL at 09:14

## 2020-02-28 RX ADMIN — DORZOLAMIDE HYDROCHLORIDE 1 DROP(S): 20 SOLUTION/ DROPS OPHTHALMIC at 05:42

## 2020-02-28 RX ADMIN — DORZOLAMIDE HYDROCHLORIDE 1 DROP(S): 20 SOLUTION/ DROPS OPHTHALMIC at 14:18

## 2020-02-28 NOTE — DISCHARGE NOTE NURSING/CASE MANAGEMENT/SOCIAL WORK - PATIENT PORTAL LINK FT
You can access the FollowMyHealth Patient Portal offered by [facility] by registering at the following website: [email or web address]. By joining [facility]’s FollowMyHealth portal, you will also be able to view your health information using other applications (apps) compatible with our system.

## 2020-03-02 PROBLEM — F32.9 MAJOR DEPRESSIVE DISORDER, SINGLE EPISODE, UNSPECIFIED: Chronic | Status: ACTIVE | Noted: 2020-02-24

## 2020-03-02 PROBLEM — E03.9 HYPOTHYROIDISM, UNSPECIFIED: Chronic | Status: ACTIVE | Noted: 2020-02-24

## 2020-03-02 PROBLEM — E78.5 HYPERLIPIDEMIA, UNSPECIFIED: Chronic | Status: ACTIVE | Noted: 2020-02-24

## 2020-03-03 RX ORDER — ATORVASTATIN CALCIUM 10 MG/1
10 TABLET, FILM COATED ORAL
Qty: 90 | Refills: 0 | Status: DISCONTINUED | COMMUNITY
Start: 2019-11-24 | End: 2020-03-03

## 2020-03-11 ENCOUNTER — APPOINTMENT (OUTPATIENT)
Dept: FAMILY MEDICINE | Facility: CLINIC | Age: 85
End: 2020-03-11
Payer: MEDICARE

## 2020-03-11 VITALS
DIASTOLIC BLOOD PRESSURE: 70 MMHG | HEART RATE: 72 BPM | SYSTOLIC BLOOD PRESSURE: 110 MMHG | TEMPERATURE: 97.3 F | BODY MASS INDEX: 28.85 KG/M2 | OXYGEN SATURATION: 95 % | HEIGHT: 64 IN | WEIGHT: 169 LBS

## 2020-03-11 DIAGNOSIS — R82.90 UNSPECIFIED ABNORMAL FINDINGS IN URINE: ICD-10-CM

## 2020-03-11 DIAGNOSIS — Z00.00 ENCOUNTER FOR GENERAL ADULT MEDICAL EXAMINATION W/OUT ABNORMAL FINDINGS: ICD-10-CM

## 2020-03-11 DIAGNOSIS — N39.0 URINARY TRACT INFECTION, SITE NOT SPECIFIED: ICD-10-CM

## 2020-03-11 DIAGNOSIS — E87.6 HYPOKALEMIA: ICD-10-CM

## 2020-03-11 DIAGNOSIS — H54.3 UNQUALIFIED VISUAL LOSS, BOTH EYES: ICD-10-CM

## 2020-03-11 DIAGNOSIS — R44.0 AUDITORY HALLUCINATIONS: ICD-10-CM

## 2020-03-11 DIAGNOSIS — Z92.29 PERSONAL HISTORY OF OTHER DRUG THERAPY: ICD-10-CM

## 2020-03-11 PROCEDURE — 36415 COLL VENOUS BLD VENIPUNCTURE: CPT

## 2020-03-11 PROCEDURE — 99495 TRANSJ CARE MGMT MOD F2F 14D: CPT | Mod: 25

## 2020-03-11 RX ORDER — ZOSTER VACCINE RECOMBINANT, ADJUVANTED 50 MCG/0.5
50 KIT INTRAMUSCULAR
Qty: 1 | Refills: 1 | Status: ACTIVE | OUTPATIENT
Start: 2020-03-11

## 2020-03-12 PROBLEM — Z92.29 HISTORY OF INFLUENZA VACCINATION: Status: RESOLVED | Noted: 2019-10-09 | Resolved: 2020-03-12

## 2020-03-12 PROBLEM — R44.0 AUDITORY HALLUCINATION: Status: RESOLVED | Noted: 2019-08-20 | Resolved: 2020-03-12

## 2020-03-12 PROBLEM — H54.3 LOW VISION, BOTH EYES: Status: ACTIVE | Noted: 2019-01-21

## 2020-03-12 NOTE — PHYSICAL EXAM
[No Acute Distress] : no acute distress [Normal TMs] : both tympanic membranes were normal [Supple] : supple [Clear to Auscultation] : lungs were clear to auscultation bilaterally [Normal S1, S2] : normal S1 and S2 [Soft] : abdomen soft [Normal Anterior Cervical Nodes] : no anterior cervical lymphadenopathy [No CVA Tenderness] : no CVA  tenderness [Grossly Normal Strength/Tone] : grossly normal strength/tone [No Skin Lesions] : no skin lesions [Normal Gait] : normal gait [Normal Affect] : the affect was normal

## 2020-03-12 NOTE — REVIEW OF SYSTEMS
[Fever] : no fever [Earache] : no earache [Chest Pain] : no chest pain [Shortness Of Breath] : no shortness of breath [Abdominal Pain] : no abdominal pain [FreeTextEntry3] : pt does have visual deficits

## 2020-03-12 NOTE — DATA REVIEWED
[FreeTextEntry1] : reviewed paperwork from hospital - incl labs which revealed k 3.8, hgb was 13, trop were all sl pos cr was .61, tsh was 2.6, and ldl was 64 at [facility] w good hdl \par ct brain reviewed andn negative

## 2020-03-12 NOTE — HISTORY OF PRESENT ILLNESS
[Family Member] : family member [FreeTextEntry1] : Pt. presents in office today to get paper work filled out and signed for a nursing home.additionally ptmis here for [facility] dc from 2/25 to 3/3 , our nurse did tcm w family and pt is here for that as well as admit paperwork  [de-identified] : pt  is here for hospiatl fu, pt was admitted for fall and found to have takusabu, pt  was er day before w uti, sent home and then went back after the fall, pt will be fu w cardio for depressed ef, pt has appt next week, pt is on some new meds and needs admission paperwork for the [facility] , as she will be living near her son who lives out that way .

## 2020-03-16 ENCOUNTER — APPOINTMENT (OUTPATIENT)
Dept: CARDIOLOGY | Facility: CLINIC | Age: 85
End: 2020-03-16
Payer: MEDICARE

## 2020-03-16 VITALS
BODY MASS INDEX: 29.37 KG/M2 | HEIGHT: 64 IN | HEART RATE: 53 BPM | DIASTOLIC BLOOD PRESSURE: 70 MMHG | SYSTOLIC BLOOD PRESSURE: 130 MMHG | WEIGHT: 172 LBS | OXYGEN SATURATION: 94 %

## 2020-03-16 DIAGNOSIS — I25.10 ATHEROSCLEROTIC HEART DISEASE OF NATIVE CORONARY ARTERY W/OUT ANGINA PECTORIS: ICD-10-CM

## 2020-03-16 PROCEDURE — 93000 ELECTROCARDIOGRAM COMPLETE: CPT

## 2020-03-16 PROCEDURE — 99214 OFFICE O/P EST MOD 30 MIN: CPT

## 2020-03-17 ENCOUNTER — NON-APPOINTMENT (OUTPATIENT)
Age: 85
End: 2020-03-17

## 2020-03-17 PROBLEM — I25.10 CORONARY ARTERY DISEASE: Status: ACTIVE | Noted: 2020-03-11

## 2020-03-17 RX ORDER — CLOPIDOGREL BISULFATE 75 MG/1
75 TABLET, FILM COATED ORAL
Qty: 90 | Refills: 0 | Status: DISCONTINUED | OUTPATIENT
Start: 2020-03-03 | End: 2020-03-17

## 2020-03-17 NOTE — REASON FOR VISIT
[Follow-Up - From Hospitalization] : follow-up of a recent hospitalization for [Admitted for Heart Failure] : patient was admitted for heart failure [Family Member] : family member

## 2020-03-17 NOTE — PHYSICAL EXAM
[General Appearance - Well Developed] : well developed [General Appearance - Well Nourished] : well nourished [] : no respiratory distress [Respiration, Rhythm And Depth] : normal respiratory rhythm and effort [Auscultation Breath Sounds / Voice Sounds] : lungs were clear to auscultation bilaterally [Chest Palpation] : palpation of the chest revealed no abnormalities [Heart Rate And Rhythm] : heart rate and rhythm were normal [Heart Sounds] : normal S1 and S2 [Murmurs] : no murmurs present [Arterial Pulses Normal] : the arterial pulses were normal [Edema] : no peripheral edema present [Abdomen Soft] : soft [Abdomen Tenderness] : non-tender [Abnormal Walk] : normal gait [Oriented To Time, Place, And Person] : oriented to person, place, and time

## 2020-03-20 ENCOUNTER — RX RENEWAL (OUTPATIENT)
Age: 85
End: 2020-03-20

## 2020-03-20 LAB
ALBUMIN SERPL ELPH-MCNC: 4.4 G/DL
ALP BLD-CCNC: 121 U/L
ALT SERPL-CCNC: 20 U/L
ANION GAP SERPL CALC-SCNC: 13 MMOL/L
AST SERPL-CCNC: 26 U/L
BILIRUB SERPL-MCNC: 0.5 MG/DL
BUN SERPL-MCNC: 19 MG/DL
CALCIUM SERPL-MCNC: 10.2 MG/DL
CHLORIDE SERPL-SCNC: 110 MMOL/L
CO2 SERPL-SCNC: 23 MMOL/L
CREAT SERPL-MCNC: 0.61 MG/DL
GLUCOSE SERPL-MCNC: 93 MG/DL
M TB IFN-G BLD-IMP: NEGATIVE
POTASSIUM SERPL-SCNC: 4.1 MMOL/L
PROT SERPL-MCNC: 7.2 G/DL
QUANTIFERON TB PLUS MITOGEN MINUS NIL: 7.75 IU/ML
QUANTIFERON TB PLUS NIL: 0.01 IU/ML
QUANTIFERON TB PLUS TB1 MINUS NIL: 0 IU/ML
QUANTIFERON TB PLUS TB2 MINUS NIL: 0 IU/ML
SODIUM SERPL-SCNC: 146 MMOL/L

## 2020-03-20 NOTE — REVIEW OF SYSTEMS
[Headache] : no headache [Feeling Fatigued] : not feeling fatigued [Shortness Of Breath] : no shortness of breath [Dyspnea on exertion] : not dyspnea during exertion [Chest  Pressure] : no chest pressure [Chest Pain] : no chest pain [Lower Ext Edema] : no extremity edema [Palpitations] : no palpitations [Cough] : no cough [Nausea] : no nausea [Vomiting] : no vomiting [Dizziness] : no dizziness [Convulsions] : no convulsions [Easy Bleeding] : no tendency for easy bleeding [Easy Bruising] : no tendency for easy bruising

## 2020-03-20 NOTE — ADDENDUM
[FreeTextEntry1] : pt seen and examined. \par plan of care dw daughter as well\par pt with stress induced CM\par cont with current meds. \par Repeat TTE\par FU in 6 months

## 2020-03-20 NOTE — HISTORY OF PRESENT ILLNESS
[FreeTextEntry1] : Ms. Torres is an 88 year old female who presents today for follow up s/p hospitalization at [facility] for mechanical fall/NSTEMI 2/25/20-2/28/20.  \par \par History significant for hypothyroidism, depression, who presented to ED s/p fall. Patient reports she felt weak upon admission and after falling as not able to get up.  She was able to recall the events. In the ED, troponins were positive and she underwent catheterization which showed non-obstructive CAD, likely stress induced cardiomyopathy EF 25%.  Beta blocker, statin, plavix and losartan were initiated.  While admitted she underwent CT head which was unremarkable, and was positive for UTI for which antibiotics were initiated. \par \par Since hospitalization she reports she has felt well. Denies chest pain, shortness of breath, HERNANDEZ, lightheadedness, dizziness, syncope. She is able to complete all her ADL's, although does have limited activity now avoiding stairs after fall.  \par \par

## 2020-03-20 NOTE — DISCUSSION/SUMMARY
[FreeTextEntry1] : \par Ms. Torres is an 88 year old female who presents today for follow up s/p hospitalization at [facility] for mechanical fall/NSTEMI 2/25/20-2/28/20.  \par \par History significant for hypothyroidism, depression, who presented to ED s/p fall. Patient reports she felt weak upon admission and after falling as not able to get up.  She was able to recall the events. In the ED, troponins were positive and she underwent catheterization which showed non-obstructive CAD, likely stress induced cardiomyopathy EF 25%.  Beta blocker, statin, plavix and losartan were initiated.  While admitted she underwent CT head which was unremarkable, and was positive for UTI for which antibiotics were initiated. \par \par Since hospitalization she reports she has felt well. Denies chest pain, shortness of breath, HERNANDEZ, lightheadedness, dizziness, syncope. She is able to complete all her ADL's, although does have limited activity now avoiding stairs after fall.  \par \par A/p\par \par - Stress induced cardiomyopathy with cath revealing mild atherosclerosis with no flow limiting lesions - Continue aspirin, metoprolol, losartan,atorvastatin. May discontinue plavix. Repeat echocardiogram in 1 month to reevaluate EF. Patient is euvolemia on exam today. Counseled for s/s  to report \par HTN- controlled \par Hyperlipidemia- Patient reports blood work drawn and dose of atorvastatin recently adjusted by Dr. Lopez\par \par She will follow up with Dr. Soni in 6 months or sooner if needed. \par \par Olga Ibarra ANP-C \par \par

## 2020-04-16 ENCOUNTER — APPOINTMENT (OUTPATIENT)
Dept: CARDIOLOGY | Facility: CLINIC | Age: 85
End: 2020-04-16

## 2020-04-27 ENCOUNTER — RX RENEWAL (OUTPATIENT)
Age: 85
End: 2020-04-27

## 2020-04-28 ENCOUNTER — RX RENEWAL (OUTPATIENT)
Age: 85
End: 2020-04-28

## 2020-05-28 NOTE — ED BEHAVIORAL HEALTH ASSESSMENT NOTE - EMPLOYMENT
Critical Care Outreach Nurse Progress Report: 
 
Subjective: In to assess pt secondary to transfer from ICU MEWS Score: 1 (05/27/20 1713) Vitals:  
 05/27/20 1448 05/27/20 1713 05/27/20 1925 05/27/20 2000 BP: 152/79 104/72  128/72 Pulse: 94 85  86 Resp: 19 18 Temp: 97.5 °F (36.4 °C) 98.6 °F (37 °C)  98.5 °F (36.9 °C) SpO2: 91% 95% 96% 94% Weight:      
Height:      
  
 
LAB DATA: 
 
Recent Labs  
  05/27/20 
0559 05/25/20 
0108  138  
K 4.1 4.8  
 105 CO2 27 25 AGAP 8 8 * 232* BUN 41* 32* CREA 2.00* 2.16* GFRAA 42* 39* GFRNA 35* 32*  
CA 8.1* 8.0*  
MG  --  1.7* ALB  --  1.8* TP  --  7.1 GLOB  --  5.3* AGRAT  --  0.3* ALT  --  18 Recent Labs  
  05/27/20 
1553 05/27/20 
0559 05/26/20 
1525 05/25/20 
9035 WBC  --  16.0* 21.2* 26.2* HGB 8.0* 6.8* 7.0* 8.5* HCT  --  22.6* 23.3* 29.1*  
PLT  --  238 243 234 Objective: Patient resting quietly in bed. Pain Intensity 1: 0 (05/27/20 1428) Patient Stated Pain Goal: 0 Assessment: Patient resting quietly in bed, arouses easily. Oriented x4. R foot dressing c/d/i. Patient's oxygen saturation 98% on 7L NC. VSS. Plan: continue to follow per outreach protocol. Retired

## 2020-06-29 ENCOUNTER — RX RENEWAL (OUTPATIENT)
Age: 85
End: 2020-06-29

## 2020-07-29 ENCOUNTER — RX RENEWAL (OUTPATIENT)
Age: 85
End: 2020-07-29

## 2020-08-10 ENCOUNTER — APPOINTMENT (OUTPATIENT)
Dept: CARDIOLOGY | Facility: CLINIC | Age: 85
End: 2020-08-10

## 2020-08-16 ENCOUNTER — RESULT CHARGE (OUTPATIENT)
Age: 85
End: 2020-08-16

## 2020-08-17 ENCOUNTER — LABORATORY RESULT (OUTPATIENT)
Age: 85
End: 2020-08-17

## 2020-08-17 ENCOUNTER — APPOINTMENT (OUTPATIENT)
Dept: FAMILY MEDICINE | Facility: CLINIC | Age: 85
End: 2020-08-17
Payer: MEDICARE

## 2020-08-17 VITALS
WEIGHT: 168.38 LBS | BODY MASS INDEX: 28.75 KG/M2 | TEMPERATURE: 98 F | DIASTOLIC BLOOD PRESSURE: 82 MMHG | HEIGHT: 64 IN | OXYGEN SATURATION: 97 % | SYSTOLIC BLOOD PRESSURE: 126 MMHG | HEART RATE: 52 BPM

## 2020-08-17 DIAGNOSIS — Z02.9 ENCOUNTER FOR ADMINISTRATIVE EXAMINATIONS, UNSPECIFIED: ICD-10-CM

## 2020-08-17 DIAGNOSIS — I10 ESSENTIAL (PRIMARY) HYPERTENSION: ICD-10-CM

## 2020-08-17 DIAGNOSIS — E03.9 HYPOTHYROIDISM, UNSPECIFIED: ICD-10-CM

## 2020-08-17 DIAGNOSIS — Z11.1 ENCOUNTER FOR SCREENING FOR RESPIRATORY TUBERCULOSIS: ICD-10-CM

## 2020-08-17 DIAGNOSIS — R35.0 FREQUENCY OF MICTURITION: ICD-10-CM

## 2020-08-17 LAB
BILIRUB UR QL STRIP: NEGATIVE
GLUCOSE UR-MCNC: NEGATIVE
HCG UR QL: 1 EU/DL
HGB UR QL STRIP.AUTO: NEGATIVE
KETONES UR-MCNC: NEGATIVE
LEUKOCYTE ESTERASE UR QL STRIP: NORMAL
NITRITE UR QL STRIP: NEGATIVE
PH UR STRIP: 5
PROT UR STRIP-MCNC: NEGATIVE
SP GR UR STRIP: 1.02

## 2020-08-17 PROCEDURE — 99214 OFFICE O/P EST MOD 30 MIN: CPT | Mod: 25

## 2020-08-17 PROCEDURE — 81003 URINALYSIS AUTO W/O SCOPE: CPT | Mod: QW

## 2020-08-18 PROBLEM — Z02.9 ADMINISTRATIVE ENCOUNTER: Status: ACTIVE | Noted: 2020-08-18

## 2020-08-18 PROBLEM — I10 HTN (HYPERTENSION): Status: ACTIVE | Noted: 2018-05-10

## 2020-08-18 PROBLEM — E03.9 HYPOTHYROID: Status: ACTIVE | Noted: 2018-09-13

## 2020-08-18 NOTE — PHYSICAL EXAM
[No Acute Distress] : no acute distress [Well Nourished] : well nourished [Well-Appearing] : well-appearing [Normal Sclera/Conjunctiva] : normal sclera/conjunctiva [Well Developed] : well developed [EOMI] : extraocular movements intact [Normal Outer Ear/Nose] : the outer ears and nose were normal in appearance [Supple] : supple [No Respiratory Distress] : no respiratory distress  [No Accessory Muscle Use] : no accessory muscle use [Grossly Normal Strength/Tone] : grossly normal strength/tone [No Rash] : no rash [No Joint Swelling] : no joint swelling [Coordination Grossly Intact] : coordination grossly intact [No Focal Deficits] : no focal deficits [Normal Insight/Judgement] : insight and judgment were intact [Normal Affect] : the affect was normal [Normal Gait] : normal gait

## 2020-08-18 NOTE — HISTORY OF PRESENT ILLNESS
[FreeTextEntry1] : Patient presents for f/u to have paperwork filled out for assisted living.\par Patient has a f/u this week with her Cardiologist for an Echo. She denies complaints at this time. [Family Member] : family member [de-identified] : Patient's daughter is with her and is also requesting UA for mother since she tends to get UTI's with no apparent symptoms. The move to assisted living was postponed due to covid-19. Patient is getting Quantiferon testing done today and will be having covid-19 testing done with Dr. Barajas.\par

## 2020-08-19 ENCOUNTER — APPOINTMENT (OUTPATIENT)
Dept: CARDIOLOGY | Facility: CLINIC | Age: 85
End: 2020-08-19
Payer: MEDICARE

## 2020-08-19 PROCEDURE — 93306 TTE W/DOPPLER COMPLETE: CPT

## 2020-08-20 ENCOUNTER — NON-APPOINTMENT (OUTPATIENT)
Age: 85
End: 2020-08-20

## 2020-08-20 ENCOUNTER — APPOINTMENT (OUTPATIENT)
Dept: CARDIOLOGY | Facility: CLINIC | Age: 85
End: 2020-08-20
Payer: MEDICARE

## 2020-08-20 VITALS
HEIGHT: 64 IN | SYSTOLIC BLOOD PRESSURE: 128 MMHG | DIASTOLIC BLOOD PRESSURE: 62 MMHG | WEIGHT: 172 LBS | BODY MASS INDEX: 29.37 KG/M2 | OXYGEN SATURATION: 97 % | HEART RATE: 49 BPM

## 2020-08-20 DIAGNOSIS — R03.0 ELEVATED BLOOD-PRESSURE READING, W/OUT DIAGNOSIS OF HYPERTENSION: ICD-10-CM

## 2020-08-20 DIAGNOSIS — I42.9 CARDIOMYOPATHY, UNSPECIFIED: ICD-10-CM

## 2020-08-20 DIAGNOSIS — I25.2 OLD MYOCARDIAL INFARCTION: ICD-10-CM

## 2020-08-20 DIAGNOSIS — E78.5 HYPERLIPIDEMIA, UNSPECIFIED: ICD-10-CM

## 2020-08-20 PROCEDURE — 93000 ELECTROCARDIOGRAM COMPLETE: CPT

## 2020-08-20 PROCEDURE — 99214 OFFICE O/P EST MOD 30 MIN: CPT

## 2020-08-20 RX ORDER — ATORVASTATIN CALCIUM 20 MG/1
20 TABLET, FILM COATED ORAL
Qty: 30 | Refills: 0 | Status: ACTIVE | OUTPATIENT
Start: 2020-03-03

## 2020-08-20 RX ORDER — ASPIRIN ENTERIC COATED TABLETS 81 MG 81 MG/1
81 TABLET, DELAYED RELEASE ORAL
Qty: 30 | Refills: 0 | Status: DISCONTINUED | OUTPATIENT
Start: 2020-08-20 | End: 2020-08-20

## 2020-08-20 RX ORDER — ATORVASTATIN CALCIUM 40 MG/1
40 TABLET, FILM COATED ORAL
Qty: 30 | Refills: 0 | Status: DISCONTINUED | COMMUNITY
Start: 2020-02-27

## 2020-08-20 RX ORDER — METOPROLOL SUCCINATE 25 MG/1
25 TABLET, EXTENDED RELEASE ORAL
Qty: 30 | Refills: 0 | Status: DISCONTINUED | OUTPATIENT
Start: 2020-03-03 | End: 2020-08-20

## 2020-08-20 RX ORDER — LOSARTAN POTASSIUM 25 MG/1
25 TABLET, FILM COATED ORAL
Qty: 30 | Refills: 0 | Status: ACTIVE | OUTPATIENT
Start: 2020-03-03

## 2020-08-20 RX ORDER — LEVOTHYROXINE SODIUM 0.03 MG/1
25 TABLET ORAL
Qty: 30 | Refills: 0 | Status: ACTIVE | OUTPATIENT
Start: 2019-03-28

## 2020-08-20 RX ORDER — SERTRALINE 25 MG/1
25 TABLET, FILM COATED ORAL
Qty: 30 | Refills: 0 | Status: ACTIVE | OUTPATIENT
Start: 2019-08-20

## 2020-08-20 RX ORDER — ASPIRIN 81 MG
81 TABLET, DELAYED RELEASE (ENTERIC COATED) ORAL DAILY
Refills: 0 | Status: DISCONTINUED | COMMUNITY
End: 2020-08-20

## 2020-08-20 NOTE — PHYSICAL EXAM
[Well Groomed] : well groomed [Normal Conjunctiva] : the conjunctiva exhibited no abnormalities [FreeTextEntry1] : Clear to auscultation bilaterally [] : no rash [Skin Turgor] : normal skin turgor [Oriented To Time, Place, And Person] : oriented to person, place, and time [Impaired Insight] : insight and judgment were intact

## 2020-08-20 NOTE — ASSESSMENT
[FreeTextEntry1] : Patient had a repeat echocardiogram yesterday demonstrating normal LV systolic function.\par Her cardiomyopathy has resolved and she is euvolemic on exam.  She is bradycardic.\par Cannot DC aspirin.\par DC metoprolol.\par Continue for losartan for now.\par Advised patient to follow-up in 6 months or earlier if need be.\par

## 2020-08-20 NOTE — REVIEW OF SYSTEMS
[Recent Weight Gain (___ Lbs)] : no recent weight gain [Eyeglasses] : currently wearing eyeglasses [see HPI] : see HPI [Palpitations] : no palpitations [Lower Ext Edema] : no extremity edema [Chest Pain] : no chest pain [Nausea] : no nausea [Abdominal Pain] : no abdominal pain [Wheezing] : no wheezing [Joint Pain] : joint pain [Change in Appetite] : no change in appetite [Dysuria] : no dysuria [Easy Bleeding] : no tendency for easy bleeding [Easy Bruising] : no tendency for easy bruising [Muscle Cramps] : no muscle cramps

## 2020-08-20 NOTE — HISTORY OF PRESENT ILLNESS
[FreeTextEntry1] : 89-year-old female who is accompanied by her daughter with a history of mechanical fall, non-STEMI hospitalized in February 2020 and found to have LVEF of 25% patient was started on heart failure medication.  She denies any PND orthopnea.  She walks with a cane.  She denies having any leg edema.  No chest pain.  Compliant with medications.\par \par EKG was sinus bradycardia normal axis and intervals no ST-T changes.

## 2020-08-31 LAB
BACTERIA UR CULT: NORMAL
M TB IFN-G BLD-IMP: NEGATIVE
QUANTIFERON TB PLUS MITOGEN MINUS NIL: 8.48 IU/ML
QUANTIFERON TB PLUS NIL: 0.02 IU/ML
QUANTIFERON TB PLUS TB1 MINUS NIL: -0.01 IU/ML
QUANTIFERON TB PLUS TB2 MINUS NIL: -0.01 IU/ML

## 2020-09-24 ENCOUNTER — APPOINTMENT (OUTPATIENT)
Dept: CARDIOLOGY | Facility: CLINIC | Age: 85
End: 2020-09-24
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
## 2020-12-23 PROBLEM — N39.0 ACUTE UTI: Status: RESOLVED | Noted: 2019-03-28 | Resolved: 2020-12-23

## 2021-10-06 PROBLEM — H18.599 ANTERIOR CORNEAL DYSTROPHY: Status: ACTIVE | Noted: 2019-01-21

## 2021-10-07 ENCOUNTER — INPATIENT (INPATIENT)
Facility: HOSPITAL | Age: 86
LOS: 11 days | Discharge: HOME HEALTH | End: 2021-10-19
Admitting: STUDENT IN AN ORGANIZED HEALTH CARE EDUCATION/TRAINING PROGRAM
Payer: MEDICARE

## 2021-10-07 DIAGNOSIS — Z96.653 PRESENCE OF ARTIFICIAL KNEE JOINT, BILATERAL: Chronic | ICD-10-CM

## 2021-10-07 PROCEDURE — 93010 ELECTROCARDIOGRAM REPORT: CPT

## 2021-10-07 PROCEDURE — 99285 EMERGENCY DEPT VISIT HI MDM: CPT

## 2021-10-07 PROCEDURE — 70450 CT HEAD/BRAIN W/O DYE: CPT | Mod: 26

## 2021-10-07 PROCEDURE — 71045 X-RAY EXAM CHEST 1 VIEW: CPT | Mod: 26

## 2021-10-14 PROCEDURE — 93010 ELECTROCARDIOGRAM REPORT: CPT

## 2021-10-15 PROCEDURE — 93010 ELECTROCARDIOGRAM REPORT: CPT

## 2021-10-16 PROCEDURE — 93010 ELECTROCARDIOGRAM REPORT: CPT

## 2022-06-27 ENCOUNTER — EMERGENCY (EMERGENCY)
Facility: HOSPITAL | Age: 87
LOS: 1 days | Discharge: HOME | End: 2022-06-27
Admitting: EMERGENCY MEDICINE
Payer: MEDICARE

## 2022-06-27 DIAGNOSIS — Z96.653 PRESENCE OF ARTIFICIAL KNEE JOINT, BILATERAL: Chronic | ICD-10-CM

## 2022-06-27 DIAGNOSIS — S09.8XXA OTHER SPECIFIED INJURIES OF HEAD, INITIAL ENCOUNTER: ICD-10-CM

## 2022-06-27 DIAGNOSIS — S01.01XA LACERATION WITHOUT FOREIGN BODY OF SCALP, INITIAL ENCOUNTER: ICD-10-CM
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
## 2022-06-27 DIAGNOSIS — Z04.3 ENCOUNTER FOR EXAMINATION AND OBSERVATION FOLLOWING OTHER ACCIDENT: ICD-10-CM

## 2022-06-27 DIAGNOSIS — F03.90 UNSPECIFIED DEMENTIA WITHOUT BEHAVIORAL DISTURBANCE: ICD-10-CM
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
## 2022-06-27 PROCEDURE — 70450 CT HEAD/BRAIN W/O DYE: CPT | Mod: 26,MA

## 2022-06-27 PROCEDURE — 99284 EMERGENCY DEPT VISIT MOD MDM: CPT | Mod: FS

## 2022-06-27 PROCEDURE — 72125 CT NECK SPINE W/O DYE: CPT | Mod: 26,MA
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
## 2023-08-24 ENCOUNTER — EMERGENCY (EMERGENCY)
Facility: HOSPITAL | Age: 88
LOS: 1 days | Discharge: HOME | End: 2023-08-24
Attending: EMERGENCY MEDICINE
Payer: MEDICARE

## 2023-08-24 VITALS
WEIGHT: 149.91 LBS | HEART RATE: 86 BPM | TEMPERATURE: 98 F | SYSTOLIC BLOOD PRESSURE: 118 MMHG | OXYGEN SATURATION: 97 % | HEIGHT: 66 IN | RESPIRATION RATE: 20 BRPM | DIASTOLIC BLOOD PRESSURE: 57 MMHG

## 2023-08-24 DIAGNOSIS — Z96.653 PRESENCE OF ARTIFICIAL KNEE JOINT, BILATERAL: Chronic | ICD-10-CM

## 2023-08-24 PROCEDURE — 72125 CT NECK SPINE W/O DYE: CPT | Mod: MA

## 2023-08-24 PROCEDURE — 82962 GLUCOSE BLOOD TEST: CPT

## 2023-08-24 PROCEDURE — 70450 CT HEAD/BRAIN W/O DYE: CPT | Mod: MA

## 2023-08-24 PROCEDURE — 99284 EMERGENCY DEPT VISIT MOD MDM: CPT | Mod: 25

## 2023-08-24 PROCEDURE — 99284 EMERGENCY DEPT VISIT MOD MDM: CPT | Mod: GC

## 2023-08-24 PROCEDURE — 70486 CT MAXILLOFACIAL W/O DYE: CPT | Mod: MA

## 2023-08-24 NOTE — ED PROVIDER NOTE - PROGRESS NOTE DETAILS
Ute Boyer, DO: Patient received at sign out pending CTs. Patient without any complaints. Imaging negative for acute findings. Will dc patient back to nursing facility, staff at NH called and updated of plan.

## 2023-08-24 NOTE — ED PROVIDER NOTE - NSDCPRINTRESULTS_ED_ALL_ED
pt confused Patient requests all Lab, Cardiology, and Radiology Results on their Discharge Instructions

## 2023-08-24 NOTE — ED PROVIDER NOTE - ATTENDING CONTRIBUTION TO CARE
pleasant adult female s/p mechanical fall onto face s/p falling out of wheelchair, no LOC; +small right sided facial abrasions; no lacerations; otherwise head NCAT; PERRLA, EOMI; normal ROM x 4 ext; abd and chest wall and back are all without signs of trauma; no other neuro deficits; awaiting final CT imaging; if negative, ok for d/c;    I personally saw the patient with the resident, and completed the key components of the history and physical exam. I then discussed the management plan with the resident.
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
## 2023-08-24 NOTE — ED PROVIDER NOTE - SKIN, MLM
Skin normal color for race, warm, dry and intact. Abrasion to right lower forehead with mild swelling. Small abrasion to chin.

## 2023-08-24 NOTE — ED PROVIDER NOTE - OBJECTIVE STATEMENT
Medications have been prescribed to you today please pick them up at your pharmacy they have been sent electronically.  If your pharmacy advises you that any of these prescriptions require prior authorization please allow us approximately 10 business days to complete the prior authorization process and hear back from your insurance company.    If you require refills on medications prior to your next scheduled appointment please allow 48 to 72 hours for completion of this request and please login to the live well application through which we can communicate to you that the medications have been sent to the pharmacy.    If we have ordered diagnostic studies such as CT scans, MRIs, ultrasounds, mammograms, they often require prior authorization by your insurance company.  In order to facilitate us obtaining this authorization for you it is imperative for you to schedule the appointment and notify us of the time and date of the appointment so that we can complete the authorization process for you.  Should you fail to notify us of the time and date of your appointment cannot guarantee that this procedure will be properly authorized by your insurance company.   
89 year old female with hx of dementia presents from [facility] s/p mechanical fall. Per patient's son at bedside, she is normally wheelchair bound and attempted to stand up from her wheelchair and fell forward hitting her head on the carpet. Per son patient is currently at her baseline mental status. Patient has abrasion to right side of forehead and chin. Denies any LOC, weakness, paresthesias, abdominal pain, chest pain, shortness of breath, or any other injury.

## 2023-08-24 NOTE — ED PROVIDER NOTE - NSFOLLOWUPINSTRUCTIONS_ED_ALL_ED_FT
- Follow up with primary care doctor  - Keep abrasions clean and dry  - You may take Tylenol extra strength 2 tablets every 6 hours as needed for aches, pains.

## 2023-08-24 NOTE — ED PROVIDER NOTE - CARE PLAN
1 Principal Discharge DX:	Fall at nursing home, initial encounter  Secondary Diagnosis:	Facial abrasion

## 2023-08-24 NOTE — ED PROVIDER NOTE - CONSTITUTIONAL, MLM
normal... Well appearing, no apparent distress. Alert to person and place only which is consistent with her baseline.

## 2023-08-24 NOTE — ED PROVIDER NOTE - PATIENT PORTAL LINK FT
You can access the FollowMyHealth Patient Portal offered by [facility] by registering at the following website: [email or web address]. By joining [facility]’s FollowMyHealth portal, you will also be able to view your health information using other applications (apps) compatible with our system.

## 2023-08-24 NOTE — ED PROVIDER NOTE - CLINICAL SUMMARY MEDICAL DECISION MAKING FREE TEXT BOX
89 year old female with history of dementia presents [facility] s/p mechanical fall. Patient fell forward attempting to get out of her wheelchair and hit her head on the carpet. Denies LOC, headache, or any other injury. Differential diagnosis includes intracranial bleed, fracture, contusion. Will obtain CT head and neck

## 2023-08-25 VITALS
TEMPERATURE: 97 F | HEART RATE: 53 BPM | OXYGEN SATURATION: 94 % | RESPIRATION RATE: 18 BRPM | DIASTOLIC BLOOD PRESSURE: 63 MMHG | SYSTOLIC BLOOD PRESSURE: 134 MMHG

## 2023-08-25 PROCEDURE — 70450 CT HEAD/BRAIN W/O DYE: CPT | Mod: 26,MA

## 2023-08-25 PROCEDURE — 70486 CT MAXILLOFACIAL W/O DYE: CPT | Mod: 26,MA

## 2023-08-25 PROCEDURE — 72125 CT NECK SPINE W/O DYE: CPT | Mod: 26,MA

## 2023-08-26 NOTE — PHYSICAL THERAPY INITIAL EVALUATION ADULT - HEALTH SCREEN CRITERIA
Subjective   History of Present Illness  The patient is a 24-year-old female who presents to the ER with left-sided abdominal pain that started last night 7:00 while she was at work. Patient denies any fevers, nausea, vomiting, or diarrhea. Reports last BM was yesterday.     History provided by:  Patient   used: No      Review of Systems   Gastrointestinal:  Positive for abdominal pain.     History reviewed. No pertinent past medical history.    No Known Allergies    History reviewed. No pertinent surgical history.    History reviewed. No pertinent family history.    Social History     Socioeconomic History    Marital status: Single   Tobacco Use    Smoking status: Never    Smokeless tobacco: Never   Vaping Use    Vaping Use: Every day    Substances: Nicotine, THC, Flavoring   Substance and Sexual Activity    Alcohol use: Yes     Alcohol/week: 2.0 standard drinks     Types: 2 Standard drinks or equivalent per week    Drug use: Yes     Frequency: 7.0 times per week     Types: Marijuana    Sexual activity: Defer           Objective   Physical Exam  Vitals and nursing note reviewed.   Constitutional:       Appearance: Normal appearance. She is well-developed.   HENT:      Head: Normocephalic.      Right Ear: Tympanic membrane normal.      Left Ear: Tympanic membrane normal.      Nose: Nose normal.      Mouth/Throat:      Lips: Pink.      Mouth: Mucous membranes are moist.      Pharynx: Oropharynx is clear. Uvula midline.   Eyes:      Extraocular Movements: Extraocular movements intact.      Pupils: Pupils are equal, round, and reactive to light.   Cardiovascular:      Rate and Rhythm: Normal rate and regular rhythm.      Pulses: Normal pulses.      Heart sounds: Normal heart sounds.   Pulmonary:      Effort: Pulmonary effort is normal.      Breath sounds: Normal breath sounds.   Abdominal:      General: Bowel sounds are normal.      Palpations: Abdomen is soft.      Tenderness:  in the left lower  quadrant   Musculoskeletal:         General: Normal range of motion.      Cervical back: Normal range of motion and neck supple.   Skin:     General: Skin is warm and dry.   Neurological:      General: No focal deficit present.      Mental Status: She is alert and oriented to person, place, and time.   Psychiatric:         Mood and Affect: Mood normal.         Behavior: Behavior normal. Behavior is cooperative.       Procedures           ED Course                                           Medical Decision Making  The patient is a 24-year-old female who presents to the ER with left-sided abdominal pain that started last night 7:00 while she was at work. Patient denies any fevers, nausea, vomiting, or diarrhea. Reports last BM was yesterday. Patient reports pain is worse when she moves, walks, or breaths. Denies any injury or trauma to the abdomen area. Reports she is a , and was at work last night when the pain started and co workers told here that they thought her left side looked swollen,       Problems Addressed:  Left upper quadrant abdominal pain: self-limited or minor problem    Amount and/or Complexity of Data Reviewed  Labs: ordered.  Radiology: ordered.    Risk  OTC drugs.        Final diagnoses:   Left upper quadrant abdominal pain       ED Disposition  ED Disposition       ED Disposition   Discharge    Condition   Stable    Comment   --               PATIENT CONNECTION - [facility] [id number]  [phone]  In 1 week  As needed, If symptoms worsen         Medication List      No changes were made to your prescriptions during this visit.          yes

## 2024-03-09 NOTE — INPATIENT CERTIFICATION FOR MEDICARE PATIENTS - CURRENT MEDICAL NEEDS AND CARE PLANS
assisted living/Other:/Possible Acute Rehab injury) (HCC)      (Please note that portions of this note may have been completed with a voice recognition program. Efforts were made to edit the dictations but occasionally words are mis-transcribed.)    Tom Rhodes MD [facility]        Tom Rhodes MD  03/08/24 [id number]       Tom Rhodes MD  03/08/24 [id number]

## 2024-04-04 NOTE — PATIENT PROFILE ADULT - ABILITY TO HEAR (WITH HEARING AID OR HEARING APPLIANCE IF NORMALLY USED):
Mildly to Moderately Impaired: difficulty hearing in some environments or speaker may need to increase volume or speak distinctly operating room MIRLANDE Oliver RN/operating room

## 2025-02-16 ENCOUNTER — EMERGENCY (EMERGENCY)
Facility: HOSPITAL | Age: OVER 89
LOS: 1 days | Discharge: HOME | End: 2025-02-16
Attending: EMERGENCY MEDICINE
Payer: MEDICARE

## 2025-02-16 VITALS
TEMPERATURE: 98 F | SYSTOLIC BLOOD PRESSURE: 128 MMHG | RESPIRATION RATE: 18 BRPM | DIASTOLIC BLOOD PRESSURE: 84 MMHG | OXYGEN SATURATION: 92 % | WEIGHT: 130.07 LBS | HEIGHT: 63 IN | HEART RATE: 62 BPM

## 2025-02-16 VITALS
DIASTOLIC BLOOD PRESSURE: 84 MMHG | TEMPERATURE: 98 F | SYSTOLIC BLOOD PRESSURE: 143 MMHG | RESPIRATION RATE: 18 BRPM | HEART RATE: 62 BPM | OXYGEN SATURATION: 93 %

## 2025-02-16 DIAGNOSIS — Z96.653 PRESENCE OF ARTIFICIAL KNEE JOINT, BILATERAL: Chronic | ICD-10-CM

## 2025-02-16 PROCEDURE — 72125 CT NECK SPINE W/O DYE: CPT | Mod: MC

## 2025-02-16 PROCEDURE — 72125 CT NECK SPINE W/O DYE: CPT | Mod: 26

## 2025-02-16 PROCEDURE — 70450 CT HEAD/BRAIN W/O DYE: CPT | Mod: 26

## 2025-02-16 PROCEDURE — 12001 RPR S/N/AX/GEN/TRNK 2.5CM/<: CPT

## 2025-02-16 PROCEDURE — 70450 CT HEAD/BRAIN W/O DYE: CPT | Mod: MC

## 2025-02-16 PROCEDURE — 99284 EMERGENCY DEPT VISIT MOD MDM: CPT | Mod: 25
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
## 2025-02-19 DIAGNOSIS — S01.01XA LACERATION WITHOUT FOREIGN BODY OF SCALP, INITIAL ENCOUNTER: ICD-10-CM

## 2025-02-19 DIAGNOSIS — Z79.82 LONG TERM (CURRENT) USE OF ASPIRIN: ICD-10-CM
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
## 2025-05-30 NOTE — ED BEHAVIORAL HEALTH ASSESSMENT NOTE - NS ED [facility] MED ROS RESPIRATORY
Orders:    Ambulatory referral to Psych Services; Future    
  Orders:    busPIRone (BUSPAR) 10 mg tablet; Take 1 tablet (10 mg total) by mouth 2 (two) times a day    Ambulatory referral to Psych Services; Future    
  Orders:    gabapentin (Neurontin) 600 MG tablet; Take 1 tablet (600 mg total) by mouth 3 (three) times a day    busPIRone (BUSPAR) 10 mg tablet; Take 1 tablet (10 mg total) by mouth 2 (two) times a day    Ambulatory referral to Psych Services; Future    
No complaints